# Patient Record
Sex: MALE | Race: WHITE | NOT HISPANIC OR LATINO | Employment: OTHER | URBAN - METROPOLITAN AREA
[De-identification: names, ages, dates, MRNs, and addresses within clinical notes are randomized per-mention and may not be internally consistent; named-entity substitution may affect disease eponyms.]

---

## 2018-03-06 ENCOUNTER — OFFICE VISIT - RIVER FALLS (OUTPATIENT)
Dept: FAMILY MEDICINE | Facility: CLINIC | Age: 68
End: 2018-03-06

## 2018-03-06 ASSESSMENT — MIFFLIN-ST. JEOR: SCORE: 1807.39

## 2018-03-07 LAB
CHOLEST SERPL-MCNC: 155 MG/DL
CHOLEST/HDLC SERPL: 4.1 {RATIO}
CREAT SERPL-MCNC: 0.96 MG/DL (ref 0.7–1.25)
GLUCOSE BLD-MCNC: 113 MG/DL (ref 65–99)
HDLC SERPL-MCNC: 38 MG/DL
LDLC SERPL CALC-MCNC: 94 MG/DL
NONHDLC SERPL-MCNC: 117 MG/DL
PSA SERPL-MCNC: 1.4 NG/ML
TRIGL SERPL-MCNC: 124 MG/DL

## 2018-04-17 ENCOUNTER — OFFICE VISIT - RIVER FALLS (OUTPATIENT)
Dept: FAMILY MEDICINE | Facility: CLINIC | Age: 68
End: 2018-04-17

## 2018-04-17 ASSESSMENT — MIFFLIN-ST. JEOR: SCORE: 1799.22

## 2018-04-18 LAB
GLUCOSE BLD-MCNC: 109 MG/DL (ref 65–99)
HBA1C MFR BLD: 7.2 %

## 2018-04-25 ENCOUNTER — OFFICE VISIT - RIVER FALLS (OUTPATIENT)
Dept: FAMILY MEDICINE | Facility: CLINIC | Age: 68
End: 2018-04-25

## 2018-04-25 ASSESSMENT — MIFFLIN-ST. JEOR: SCORE: 1817.36

## 2018-05-23 ENCOUNTER — OFFICE VISIT - RIVER FALLS (OUTPATIENT)
Dept: FAMILY MEDICINE | Facility: CLINIC | Age: 68
End: 2018-05-23

## 2019-01-21 ENCOUNTER — AMBULATORY - RIVER FALLS (OUTPATIENT)
Dept: FAMILY MEDICINE | Facility: CLINIC | Age: 69
End: 2019-01-21

## 2019-01-22 LAB
HBA1C MFR BLD: 6.2 %
MICROALBUMIN UR-MCNC: 0.2 MG/DL

## 2019-01-25 ENCOUNTER — OFFICE VISIT - RIVER FALLS (OUTPATIENT)
Dept: FAMILY MEDICINE | Facility: CLINIC | Age: 69
End: 2019-01-25

## 2019-01-25 ASSESSMENT — MIFFLIN-ST. JEOR: SCORE: 1726.65

## 2020-02-25 ENCOUNTER — AMBULATORY - RIVER FALLS (OUTPATIENT)
Dept: FAMILY MEDICINE | Facility: CLINIC | Age: 70
End: 2020-02-25

## 2020-02-26 ENCOUNTER — COMMUNICATION - RIVER FALLS (OUTPATIENT)
Dept: FAMILY MEDICINE | Facility: CLINIC | Age: 70
End: 2020-02-26

## 2020-02-26 LAB
BUN SERPL-MCNC: 17 MG/DL (ref 7–25)
BUN/CREAT RATIO - HISTORICAL: ABNORMAL (ref 6–22)
CALCIUM SERPL-MCNC: 8.9 MG/DL (ref 8.6–10.3)
CHLORIDE BLD-SCNC: 105 MMOL/L (ref 98–110)
CHOLEST SERPL-MCNC: 144 MG/DL
CHOLEST/HDLC SERPL: 4 {RATIO}
CO2 SERPL-SCNC: 26 MMOL/L (ref 20–32)
CREAT SERPL-MCNC: 0.88 MG/DL (ref 0.7–1.25)
CREAT UR-MCNC: 111 MG/DL (ref 20–320)
EGFRCR SERPLBLD CKD-EPI 2021: 89 ML/MIN/1.73M2
GLUCOSE BLD-MCNC: 105 MG/DL (ref 65–99)
HBA1C MFR BLD: 6.4 %
HDLC SERPL-MCNC: 36 MG/DL
LDLC SERPL CALC-MCNC: 90 MG/DL
MICROALBUMIN UR-MCNC: 0.3 MG/DL
MICROALBUMIN/CREAT UR: 3 MG/G{CREAT}
NONHDLC SERPL-MCNC: 108 MG/DL
POTASSIUM BLD-SCNC: 4.5 MMOL/L (ref 3.5–5.3)
PSA SERPL-MCNC: 1 NG/ML
SODIUM SERPL-SCNC: 139 MMOL/L (ref 135–146)
TRIGL SERPL-MCNC: 86 MG/DL

## 2020-03-06 ENCOUNTER — OFFICE VISIT - RIVER FALLS (OUTPATIENT)
Dept: FAMILY MEDICINE | Facility: CLINIC | Age: 70
End: 2020-03-06

## 2020-03-06 ASSESSMENT — MIFFLIN-ST. JEOR: SCORE: 1740.25

## 2021-04-06 ENCOUNTER — AMBULATORY - RIVER FALLS (OUTPATIENT)
Dept: FAMILY MEDICINE | Facility: CLINIC | Age: 71
End: 2021-04-06

## 2021-04-07 LAB
BUN SERPL-MCNC: 21 MG/DL (ref 7–25)
BUN/CREAT RATIO - HISTORICAL: ABNORMAL (ref 6–22)
CALCIUM SERPL-MCNC: 8.9 MG/DL (ref 8.6–10.3)
CHLORIDE BLD-SCNC: 107 MMOL/L (ref 98–110)
CHOLEST SERPL-MCNC: 165 MG/DL
CHOLEST/HDLC SERPL: 4 {RATIO}
CO2 SERPL-SCNC: 25 MMOL/L (ref 20–32)
CREAT SERPL-MCNC: 1.01 MG/DL (ref 0.7–1.25)
CREAT UR-MCNC: 100 MG/DL (ref 20–320)
EGFRCR SERPLBLD CKD-EPI 2021: 76 ML/MIN/1.73M2
GLUCOSE BLD-MCNC: 116 MG/DL (ref 65–99)
HBA1C MFR BLD: 6.3 %
HDLC SERPL-MCNC: 41 MG/DL
LDLC SERPL CALC-MCNC: 101 MG/DL
MICROALBUMIN UR-MCNC: 0.2 MG/DL
MICROALBUMIN/CREAT UR: 2 MG/G{CREAT}
NONHDLC SERPL-MCNC: 124 MG/DL
POTASSIUM BLD-SCNC: 4.7 MMOL/L (ref 3.5–5.3)
PSA SERPL-MCNC: 0.8 NG/ML
SODIUM SERPL-SCNC: 139 MMOL/L (ref 135–146)
TRIGL SERPL-MCNC: 130 MG/DL

## 2021-04-08 ENCOUNTER — COMMUNICATION - RIVER FALLS (OUTPATIENT)
Dept: FAMILY MEDICINE | Facility: CLINIC | Age: 71
End: 2021-04-08

## 2021-04-09 ENCOUNTER — OFFICE VISIT - RIVER FALLS (OUTPATIENT)
Dept: FAMILY MEDICINE | Facility: CLINIC | Age: 71
End: 2021-04-09

## 2021-04-09 ENCOUNTER — COMMUNICATION - RIVER FALLS (OUTPATIENT)
Dept: FAMILY MEDICINE | Facility: CLINIC | Age: 71
End: 2021-04-09

## 2021-04-09 ASSESSMENT — MIFFLIN-ST. JEOR: SCORE: 1765.65

## 2021-04-10 LAB
ERYTHROCYTE [DISTWIDTH] IN BLOOD BY AUTOMATED COUNT: 13.1 % (ref 11–15)
HCT VFR BLD AUTO: 43.7 % (ref 38.5–50)
HGB BLD-MCNC: 14.9 GM/DL (ref 13.2–17.1)
MCH RBC QN AUTO: 29.6 PG (ref 27–33)
MCHC RBC AUTO-ENTMCNC: 34.1 GM/DL (ref 32–36)
MCV RBC AUTO: 86.7 FL (ref 80–100)
PLATELET # BLD AUTO: 238 10*3/UL (ref 140–400)
PMV BLD: 9.5 FL (ref 7.5–12.5)
RBC # BLD AUTO: 5.04 10*6/UL (ref 4.2–5.8)
TSH SERPL DL<=0.005 MIU/L-ACNC: 4.23 MIU/L (ref 0.4–4.5)
WBC # BLD AUTO: 6.6 10*3/UL (ref 3.8–10.8)

## 2021-04-12 ENCOUNTER — COMMUNICATION - RIVER FALLS (OUTPATIENT)
Dept: FAMILY MEDICINE | Facility: CLINIC | Age: 71
End: 2021-04-12

## 2021-04-12 ENCOUNTER — AMBULATORY - RIVER FALLS (OUTPATIENT)
Dept: FAMILY MEDICINE | Facility: CLINIC | Age: 71
End: 2021-04-12

## 2021-06-01 ENCOUNTER — OFFICE VISIT - RIVER FALLS (OUTPATIENT)
Dept: FAMILY MEDICINE | Facility: CLINIC | Age: 71
End: 2021-06-01

## 2021-06-01 ASSESSMENT — MIFFLIN-ST. JEOR: SCORE: 1752.5

## 2021-06-02 ENCOUNTER — RECORDS - HEALTHEAST (OUTPATIENT)
Dept: CARDIOLOGY | Facility: TELEHEALTH | Age: 71
End: 2021-06-02

## 2021-06-02 DIAGNOSIS — I48.92 ATRIAL FLUTTER (H): ICD-10-CM

## 2021-06-25 ENCOUNTER — OFFICE VISIT - RIVER FALLS (OUTPATIENT)
Dept: FAMILY MEDICINE | Facility: CLINIC | Age: 71
End: 2021-06-25

## 2021-06-26 LAB
CHOLEST SERPL-MCNC: 102 MG/DL
CHOLEST/HDLC SERPL: 2.7 {RATIO}
HDLC SERPL-MCNC: 38 MG/DL
LDLC SERPL CALC-MCNC: 49 MG/DL
NONHDLC SERPL-MCNC: 64 MG/DL
TRIGL SERPL-MCNC: 70 MG/DL

## 2021-06-30 ENCOUNTER — COMMUNICATION - RIVER FALLS (OUTPATIENT)
Dept: FAMILY MEDICINE | Facility: CLINIC | Age: 71
End: 2021-06-30

## 2022-02-11 VITALS
OXYGEN SATURATION: 95 % | DIASTOLIC BLOOD PRESSURE: 68 MMHG | HEART RATE: 66 BPM | WEIGHT: 227.7 LBS | HEIGHT: 67 IN | SYSTOLIC BLOOD PRESSURE: 120 MMHG | BODY MASS INDEX: 35.74 KG/M2 | TEMPERATURE: 98.4 F

## 2022-02-11 VITALS
SYSTOLIC BLOOD PRESSURE: 137 MMHG | DIASTOLIC BLOOD PRESSURE: 75 MMHG | HEIGHT: 67 IN | BODY MASS INDEX: 34.84 KG/M2 | WEIGHT: 222 LBS | HEART RATE: 71 BPM

## 2022-02-11 VITALS
BODY MASS INDEX: 36.19 KG/M2 | HEART RATE: 105 BPM | WEIGHT: 230.6 LBS | SYSTOLIC BLOOD PRESSURE: 118 MMHG | TEMPERATURE: 97.8 F | DIASTOLIC BLOOD PRESSURE: 78 MMHG | OXYGEN SATURATION: 95 % | HEIGHT: 67 IN

## 2022-02-11 VITALS
SYSTOLIC BLOOD PRESSURE: 108 MMHG | DIASTOLIC BLOOD PRESSURE: 70 MMHG | BODY MASS INDEX: 35.31 KG/M2 | HEIGHT: 67 IN | HEART RATE: 98 BPM | SYSTOLIC BLOOD PRESSURE: 128 MMHG | WEIGHT: 225 LBS | TEMPERATURE: 98.1 F | DIASTOLIC BLOOD PRESSURE: 62 MMHG

## 2022-02-11 VITALS
WEIGHT: 238 LBS | DIASTOLIC BLOOD PRESSURE: 72 MMHG | HEIGHT: 67 IN | DIASTOLIC BLOOD PRESSURE: 70 MMHG | HEART RATE: 60 BPM | WEIGHT: 239.8 LBS | BODY MASS INDEX: 37.98 KG/M2 | SYSTOLIC BLOOD PRESSURE: 138 MMHG | HEIGHT: 67 IN | BODY MASS INDEX: 37.64 KG/M2 | WEIGHT: 242 LBS | HEIGHT: 67 IN | HEART RATE: 59 BPM | SYSTOLIC BLOOD PRESSURE: 130 MMHG | TEMPERATURE: 98.4 F | BODY MASS INDEX: 37.35 KG/M2

## 2022-02-16 NOTE — LETTER
(Inserted Image. Unable to display)   September 16, 2021  JAMES MCCRACKEN  [address] [place] [address]        Dear JAMES,    Thank you for selecting [facility] for your healthcare needs.    Our records indicate you are due for the following services:     Follow-up office visit      (FYI   Regarding office visits: In some instances, a video visit or telephone visit may be offered as an option.)    To schedule an appointment or if you have further questions, please contact your clinic at [phone].    Powered by [employer]    Sincerely,    Carlitos Espinosa MD, FACP

## 2022-02-16 NOTE — RESULTS
Patient:   JAMES MCCRACKEN            MRN: [id number]            FIN: [id number]               Age:   70 years     Sex:  Male     :  1950   Associated Diagnoses:   Atrial flutter   Author:   Carlitos Espinosa MD      Procedure   EKG procedure   Date/ Time:  2021 9:32:00 AM.     Supervised by: Carlitos Espinosa MD.     Indication: palpitations.     Position: supine.     EKG findings   Interpretation: Carlitos Espinosa MD.     Rhythm: heart rate  86  beats/min, atrial flutter variable conduction.     Axis: normal axis, normal configuration.     P waves: absent: normal.     QRS complex: normal.     ST-T-U complex: normal.        Impression and Plan   Diagnosis     Atrial flutter (PAF77-ON I48.92).

## 2022-02-16 NOTE — LETTER
(Inserted Image. Unable to display)     February 22, 2021      JAMES MCCRACKEN  [address], [place] [id number]          Dear JAMES,      Thank you for selecting [facility] (previously [facility]) for your healthcare needs.    Our records indicate you are due for the following services:    Diabetic Exam ~ Please bring your glucose meter and/or your blood glucose diary to your appointment.    Urine Labs ~ Please be prepared to leave a urine specimen for evaluation.  Fasting Lab Tests ~ Please do not eat or drink anything 10 hours prior to your scheduled appointment time.  (Water and any medications that you may need are allowed unless directed otherwise.)    If you had your labs done at another facility or with Direct Access Lab Testing at [facility], please bring in a copy of the results to your next visit, mail a copy, or drop off a copy of your results to your Healthcare Provider.    (FYI   Regarding office visits: In some instances, a video visit or telephone visit may be offered as an option.)    You are due for lab work and an office visit; please schedule the lab appointment 1 week before the office visit.  This will assure all results are available to discuss with your Healthcare Provider during your visit.    **It is very helpful if you bring your medication bottles to your appointment.  This assures we have all of your current medications, including strength and dosing information, documented accurately in your medical record.    To schedule an appointment or if you have further questions, please contact your clinic at [phone].      Powered by [email or web address]    Sincerely,    Carlitos Espinosa MD, FACP

## 2022-02-16 NOTE — LETTER
(Inserted Image. Unable to display)       [address] [place] [address]  April 12, 2021      JAMES MCCRACKEN      [address] [place] [address]        Dear JAMES,    Thank you for selecting [facility] for your healthcare needs.  Below you will find the results of your recent tests done at our clinic.     Normal results.      Result Name Current Result Reference Range   TSH (mIU/L)  4.23 4/9/2021 0.40 - 4.50   WBC  6.6 4/9/2021 3.8 - 10.8   Hgb (gm/dL)  14.9 4/9/2021 13.2 - 17.1   Platelet  238 4/9/2021 140 - 400       Please contact me or my assistant at [phone] if you have any questions.     Sincerely,        Carlitos Espinosa MD

## 2022-02-16 NOTE — LETTER
(Inserted Image. Unable to display)   February 26, 2020      JAMES MCCRACKEN      [address] [place], [place] [id number]        Dear JAMES,    Thank you for selecting [facility] ([facility] for your healthcare needs.  Below you will find the results of your recent tests done at our clinic.    Results are acceptable.      Result Name Current Result Previous Result Reference Range   Sodium Level (mmol/L)  139 2/25/2020  137 3/6/2018 135 - 146   Potassium Level (mmol/L)  4.5 2/25/2020  4.7 3/6/2018 3.5 - 5.3   Chloride Level (mmol/L)  105 2/25/2020  103 3/6/2018 98 - 110   CO2 Level (mmol/L)  26 2/25/2020  27 3/6/2018 20 - 32   Glucose Level (mg/dL) ((H)) 105 2/25/2020 ((H)) 109 4/17/2018 65 - 99   BUN (mg/dL)  17 2/25/2020  15 3/6/2018 7 - 25   Creatinine Level (mg/dL)  0.88 2/25/2020  0.96 3/6/2018 0.70 - 1.25   eGFR (mL/min/1.73m2)  89 2/25/2020  83 3/6/2018 > OR = 60 -    Calcium Level (mg/dL)  8.9 2/25/2020  9.2 3/6/2018 8.6 - 10.3   Hgb A1c ((H)) 6.4 2/25/2020 ((H)) 6.2 1/21/2019  - <5.7   Cholesterol (mg/dL)  144 2/25/2020  155 3/6/2018  - <200   Non-HDL Cholesterol  108 2/25/2020  117 3/6/2018  - <130   HDL (mg/dL) ((L)) 36 2/25/2020 ((L)) 38 3/6/2018 > OR = 40 -    Cholesterol/HDL Ratio  4.0 2/25/2020  4.1 3/6/2018  - <5.0   LDL  90 2/25/2020  94 3/6/2018    Triglyceride (mg/dL)  86 2/25/2020  124 3/6/2018  - <150   PSA (ng/mL)  1.0 2/25/2020  1.4 3/6/2018  - < OR = 4.0   Ur Microalbumin/Creatinine Ratio  3 2/25/2020   - <30       Please contact me or my assistant at [phone] if you have any questions.     Sincerely,        Carlitos Espinosa MD   no

## 2022-02-16 NOTE — LETTER
(Inserted Image. Unable to display)     December 13, 2019      JAMES MCCRACKEN  [address], [place] [id number]          Dear JAMES,      Thank you for selecting [facility] (previously [facility]) for your healthcare needs.    Our records indicate you are due for the following services:    Diabetic Exam ~ Please bring your glucose meter and/or your blood glucose diary to your appointment.    Urine Labs ~ Please be prepared to leave a urine specimen for evaluation.  Fasting Lab Tests ~ Please do not eat or drink anything 10 hours prior to your scheduled appointment time.  (Water and any medications that you may need are allowed unless directed otherwise.)    If you had your labs done at another facility or with Direct Access Lab Testing at [facility], please bring in a copy of the results to your next visit, mail a copy, or drop off a copy of your results to your Healthcare Provider.    You are due for lab work and an office visit; please schedule the lab appointment 1 week before the office visit.  This will assure all results are available to discuss with your provider during your visit.    **It is very helpful if you bring your medication bottles to your appointment.  This assures we have all of your current medications, including strength and dosing information, documented accurately in your medical record.    To schedule an appointment or if you have further questions, please contact your primary clinic:   Critical access hospital       [phone]   [facility]       [phone]              [facility]     [phone]      Powered by [employer] and [employer]    Sincerely,    Carlitos Espinosa MD, FACP

## 2022-02-16 NOTE — RESULTS
(Inserted Image. Unable to display)              HOLTER MONITOR REPORT    JAMES MCCRACKEN : 1950  Date of Exam: 2021 at 9:34 a.m.  MRN: [id number]  Ordering HCP:  Carlitos Espinosa MD, FACP       INDICATION:  Atrial flutter.      FINDINGS: The Holter monitor recording period was 23 hours and 51 minutes with analysis time of 23 hours and 20 minutes.       The rhythm is atrial flutter.  Average rate of 86.  Minimum rate of 44.  Maximum rate of 161.  No pauses.                     Ventricular ectopics number 6 including 1 pair but no triplets or runs.          IMPRESSION:  Atrial flutter with controlled ventricular response.           Carlitos Espinosa MD, FACP  Interpreting HCP                    JSYLVIE/kinsey   D:  2021                                                                          T:  2021    HOLTER MONITOR REPORT

## 2022-02-16 NOTE — PROGRESS NOTES
Chief Complaint    DM check, f/u labs.  History of Present Illness      Iker is here for follow-up of his type 2 diabetes.  He continues to smoke about 5 cigarettes/day.  Reviewed his laboratories his estimated 10-year cardiovascular risk is over 30%.  We discussed the importance of statin therapy and especially of quitting smoking.  Does not wish pharmacotherapy assistance with quitting smoking.  Declines statin he really does not want to take any medications.  He is monitoring his blood glucose averages less than 110.  He feels well he is 20 pounds of his weight loss off.  Will be seeing the eye doctor tomorrow.  He is not exercising regularly and we discussed the importance of this as well.  Recommended 30 minutes minimum 5 days a week.  Review of Systems      No polyuria, headache, chest pain, dyspnea, edema.  Physical Exam   Vitals & Measurements    T: 98.1   F (Tympanic)  HR: 98(Peripheral)  BP: 128/70     HT: 66.75 in  WT: 225 lb  BMI: 35.5       No distress.  Alert and oriented.  Eyes normal.  HEENT is unremarkable.  Neck reveals normal carotid pulsations.  Chest clear.  Cardiac exam is regular no murmur no edema.  Feet appear normal.  Pulses and sensation to both monofilament and vibration intact.  Assessment/Plan       Current tobacco use (Z72.0)         Encouraged quitting as above.  Does not wish assistance.         Ordered:          [id number] office outpatient visit 25 minutes (Charge), Quantity: 1, Type 2 diabetes mellitus with hemoglobin A1c goal of less than 7.0%  Obesity  Current tobacco use  Family history of colon cancer in mother                Family history of colon cancer in mother (Z80.0)         Colonoscopy 2 years ago.         Ordered:          70974 office outpatient visit 25 minutes (Charge), Quantity: 1, Type 2 diabetes mellitus with hemoglobin A1c goal of less than 7.0%  Obesity  Current tobacco use  Family history of colon cancer in mother                Obesity (Probable) (E66.9)          Encouraged continued weight loss.         Ordered:          66756 office outpatient visit 25 minutes (Charge), Quantity: 1, Type 2 diabetes mellitus with hemoglobin A1c goal of less than 7.0%  Obesity  Current tobacco use  Family history of colon cancer in mother                Type 2 diabetes mellitus with hemoglobin A1c goal of less than 7.0% (E11.9)         Controlled without pharmacotherapy discussed the importance of quitting smoking, adding a statin to the option of metformin.  Flu and Pneumovax today.         Ordered:          86677 office outpatient visit 25 minutes (Charge), Quantity: 1, Type 2 diabetes mellitus with hemoglobin A1c goal of less than 7.0%  Obesity  Current tobacco use  Family history of colon cancer in mother                Orders:         Miscellaneous Rx Supply, AccuCheck Lancets, See Instructions, Instructions: Tests 7x week, Supply, # 1 box(es), 11 Refill(s), Type: Maintenance, Pharmacy: [facility] #[id number], Tests 7x week, (Ordered)         Miscellaneous Rx Supply, AccuCheck Test Strips, See Instructions, Instructions: Test 7x week, Supply, # 1 box(es), 11 Refill(s), Type: Maintenance, Pharmacy: [facility] #[id number], Test 7x week, (Ordered)  Patient Information     Name:JAMES MCCRACKEN      Address:      [address] [place] [id number]     Sex:Male     YOB: 1950     [phone]     Emergency Contact:CHRISITNA MCCRACKEN     MRN:[id number]     FIN:[id number]     Location:[facility]     Date of Service:03/06/2020      Primary Care Physician:       Carlitos Espinosa MD, [phone]      Attending Physician:       Carlitos Espinosa MD, [phone]  Problem List/Past Medical History    Ongoing     Current tobacco use     Family history of colon cancer in mother     Obesity     Type 2 diabetes mellitus with hemoglobin A1c goal of less than 7.0%    Historical     CHEST PAIN  Procedure/Surgical History     Colonoscopy  (05/23/2018)      Comments: Indication: Family History of colon cancer      Sedation:Fentanyl 150 mcg, Versed 2 mg      Findings: diverticulosis and one hyperplastic polyp      Rec: Repeat in 5 years.     Extracapsular cataract extraction and insertion of intraocular lens (03/27/2018)      Comments: Right..     Extracapsular cataract extraction and insertion of intraocular lens (03/13/2018)      Comments: Left..     Cholecystectomy  Medications    AccuCheck Lancets, See Instructions, 11 refills    AccuCheck Test Strips, See Instructions, 11 refills  Allergies    No Known Medication Allergies  Social History    Smoking Status - 03/06/2020     Current every day smoker     Alcohol      Current, 03/06/2018     Employment/School      Retired, 03/06/2018     Home/Environment      Marital status:  (Living together)., 03/06/2018     Tobacco      5-9 cigarettes (between 1/4 to 1/2 pack)/day in last 30 days, 03/06/2018  Family History    CAD - Coronary artery disease: Mother (Dx at 88).    Cancer of colon: Mother (Dx at 58).    Parkinson disease: Mother.  Lab Results          Lab Results (Last 4 results within 90 days)           Sodium Level: 139 mmol/L [135 mmol/L - 146 mmol/L] (02/25/20 08:49:00)          Potassium Level: 4.5 mmol/L [3.5 mmol/L - 5.3 mmol/L] (02/25/20 08:49:00)          Chloride Level: 105 mmol/L [98 mmol/L - 110 mmol/L] (02/25/20 08:49:00)          CO2 Level: 26 mmol/L [20 mmol/L - 32 mmol/L] (02/25/20 08:49:00)          Glucose Level: 105 mg/dL High [65 mg/dL - 99 mg/dL] (02/25/20 08:49:00)          BUN: 17 mg/dL [7 mg/dL - 25 mg/dL] (02/25/20 08:49:00)          Creatinine Level: 0.88 mg/dL [0.7 mg/dL - 1.25 mg/dL] (02/25/20 08:49:00)          BUN/Creat Ratio: NOT APPLICABLE [6  - 22] (02/25/20 08:49:00)          eGFR: 89 mL/min/1.73m2 (02/25/20 08:49:00)          eGFR African American: 103 mL/min/1.73m2 (02/25/20 08:49:00)          Calcium Level: 8.9 mg/dL [8.6 mg/dL - 10.3 mg/dL] (02/25/20  08:49:00)          Hgb A1c: 6.4 High (02/25/20 08:49:00)          Cholesterol: 144 mg/dL (02/25/20 08:49:00)          Non-HDL Cholesterol: 108 (02/25/20 08:49:00)          HDL: 36 mg/dL Low (02/25/20 08:49:00)          Cholesterol/HDL Ratio: 4 (02/25/20 08:49:00)          LDL: 90 (02/25/20 08:49:00)          Triglyceride: 86 mg/dL (02/25/20 08:49:00)          PSA: 1 ng/mL (02/25/20 08:49:00)          U Microalbumin: 0.3 mg/dL (02/25/20 08:49:00)          Ur Creatinine: 111 mg/dL [20 mg/dL - 320 mg/dL] (02/25/20 08:49:00)          Ur Microalbumin/Creatinine Ratio: 3 (02/25/20 08:49:00)

## 2022-02-16 NOTE — TELEPHONE ENCOUNTER
---------------------  From: Rika Kelley LPN (Phone Messages Pool ([id number]_[facility]))   To: [facility] Message Pool ([id number]);     Sent: 7/14/2021 9:09:41 AM CDT  Subject: refills     Phone Message    PCP:   NAYA      Time of Call:  9:00am       Person Calling:  pt  Phone number:  [phone]    Note:   Pt calling stating he needs refills of Xarelto and simvastatin.    No RTC on when pt is to return. Pt not sure if he was suppose to continue Xarelto. Sent in 90 day supply of simvastatin since labs were done 6/25. Please advise on Xarelto/additional refills.    Last office visit and reason:  6/1/21 Follow up atrial flutter---------------------  From: An/Lisa GASCA ([facility] Message Pool ([id number]))   To: Carlitos Espinosa MD;     Sent: 7/15/2021 7:11:15 AM CDT  Subject: FW: refills---------------------  From: Carlitos Espinosa MD   To: [facility] Message Pool ([id number]);     Sent: 7/15/2021 7:13:55 AM CDT  Subject: RE: refills     OK to refill both for one year. I would like to see the patient in 2 months.LVMTCB. I filled meds x 1 year. Please notify that he needs to schedule a visit with NAYA in 2 months.pt called back... message given and pt will call back to schedule the appt

## 2022-02-16 NOTE — TELEPHONE ENCOUNTER
---------------------  From: Luiz Villa LPN   Sent: 4/12/2021 9:43:48 AM CDT  Subject: General Message     Placed [employer] holter @ 013 per Dr Carlitos Acuña for dx of palpitations. Gave patient written & verbal instructions and sent patient home with all [employer] supplies. Patient indicated he understood. End of service date is 4-14-21.Printed Holter Monitor Report and routed to [facility] for interpretation.

## 2022-02-16 NOTE — PROGRESS NOTES
"Chief Complaint    here today for \"regular check up\"  History of Present Illness       Iker is a 70-year-old with a history of untreated dyslipidemia and diabetes controlled with dietary therapy who complains of anxiety.  He feels anxious in a quiet moments when he is sitting at rest nearly every day and has a little trouble relaxing.  When I spoke with him further it became clear that he feels palpitations that last up to several minutes at these times and irregularity though not racing necessarily of the heart.  No exertional chest pain or dyspnea.  No PND, orthopnea, edema.  No polyuria or polydipsia.  He has had both coronavirus immunizations.  BRYSON-7 is 4.  Review of Systems       No weight loss, fever, chills, headache, myalgia, cough, dyspnea.  Physical Exam   Vitals & Measurements    T: 97.8  F (Tympanic)  HR: 105 (Peripheral)  BP: 118/78  SpO2: 95%     HT: 66.75 in  WT: 230.6 lb  BMI: 36.38        Patient is an overweight but otherwise healthy-appearing man in no distress.  Alert and oriented.  Eyes normal.  HEENT exam is masked.  Neck is supple no nodes or thyromegaly.  Carotid pulsations normal.  Cardiac exam irregular.  No murmur.  No edema.  Abdomen nontender.  Feet appear normal.  Pulses and sensation intact in the feet.  Assessment/Plan       Atrial flutter (I48.92)          New onset atrial flutter with controlled response.  Does not appear to need a medication for rate control.  Discussed anticoagulation the options of direct acting oral coagulant anticoagulant versus warfarin and he chooses the former.  TSH, CBC, echocardiogram, 48-hour Holter.         Ordered:          apixaban, ( 5 mg ), Oral, bid, # 60 tab(s), 2 Refill(s), Type: Maintenance, [id number], 5 mg Oral bid, 66.75, in, 04/09/21 8:58:00 CDT, Height Measured, 230.6, lb, 04/09/21 8:58:00 CDT, Weight Measured, (Ordered)          41568 office o/p est mod 30-39 min (Charge), Quantity: 1, Atrial flutter  Type 2 " diabetes mellitus with hemoglobin A1c goal of less than 7.0%  Dyslipidemia  Current tobacco use          CBC (h/h, RBC, indices, WBC, Plt)* ([facility]), Specimen Type: Blood, Collection Date: 04/09/21 9:28:00 CDT          Echocardiogram (Request), Atrial flutter                Current tobacco use (Z72.0)          Patient is encouraged to abstain from tobacco use.         Ordered:          55284 office o/p est mod 30-39 min (Charge), Quantity: 1, Palpitations  Dyslipidemia  Type 2 diabetes mellitus with hemoglobin A1c goal of less than 7.0%  Current tobacco use          52948 office o/p est mod 30-39 min (Charge), Quantity: 1, Atrial flutter  Type 2 diabetes mellitus with hemoglobin A1c goal of less than 7.0%  Dyslipidemia  Current tobacco use                Dyslipidemia (E78.5)          Estimated 10-year cardiovascular risk is 30.8% and weight improved to 11.2% with optimal therapy including statin, aspirin, and discontinuing smoking.  Encouraged him to give up smoking.  We will start a statin.         Ordered:          64626 office o/p est mod 30-39 min (Charge), Quantity: 1, Palpitations  Dyslipidemia  Type 2 diabetes mellitus with hemoglobin A1c goal of less than 7.0%  Current tobacco use          13084 office o/p est mod 30-39 min (Charge), Quantity: 1, Atrial flutter  Type 2 diabetes mellitus with hemoglobin A1c goal of less than 7.0%  Dyslipidemia  Current tobacco use                Type 2 diabetes mellitus with hemoglobin A1c goal of less than 7.0% (E11.9)          Doing well on dietary therapy.          Ordered:          05550 office o/p est mod 30-39 min (Charge), Quantity: 1, Palpitations  Dyslipidemia  Type 2 diabetes mellitus with hemoglobin A1c goal of less than 7.0%  Current tobacco use          51626 office o/p est mod 30-39 min (Charge), Quantity: 1, Atrial flutter  Type 2 diabetes mellitus with hemoglobin A1c goal of less than 7.0%  Dyslipidemia  Current tobacco use                 Orders:         simvastatin, = 1 tab(s) ( 40 mg ), Oral, hs, # 90 tab(s), 0 Refill(s), Type: Maintenance, Pharmacy: [facility] #[id number], 1 tab(s) Oral hs, 66.75, in, 04/09/21 8:58:00 CDT, Height Measured, 230.6, lb, 04/09/21 8:58:00 CDT, Weight Measured, (Ordered)         48 Hour Holter Monitor (Request), Palpitations         46708 ecg routine ecg w/least 12 lds w/i+r (Charge), Quantity: 1, Palpitations         Basic Metabolic Panel* ([facility]), Specimen Type: Serum, Collection Date: 04/06/21 7:00:00 CDT         Hemoglobin A1c* ([facility]), Specimen Type: Blood, Collection Date: 04/06/21 7:00:00 CDT         Lipid panel with reflex to direct ldl* ([facility]), Specimen Type: Serum, Collection Date: 04/06/21 7:00:00 CDT         Microalbumin, random urine (w/creatinine)* ([facility]), Specimen Type: Urine, Collection Date: 04/06/21 7:00:00 CDT         PSA, Total (Room)* ([facility]), Specimen Type: Serum, Collection Date: 04/06/21 7:00:00 CDT         Return to Clinic (Request), RFV: lipids, Return in 6-8 weeks         TSH* ([facility]), Specimen Type: Serum, Collection Date: 04/09/21 9:10:00 CDT  Patient Information     Name:JAMES MCCRACKEN      Address:      [address] [place] [id number]     Sex:Male     YOB: 1950     [phone]     Emergency Contact:CHRISTINA MCCRACKEN     MRN:[id number]     FIN:[id number]     Location:[facility]     Date of Service:04/09/2021      Primary Care Physician:       Carlitos Espinosa MD, [phone]      Attending Physician:       Carlitos Espinosa MD, [phone]  Problem List/Past Medical History    Ongoing     Current tobacco use     Dyslipidemia     Family history of colon cancer in mother     Obesity     Refusal of statin medication by patient     Type 2 diabetes mellitus with hemoglobin A1c goal of less than 7.0%    Historical     CHEST PAIN  Procedure/Surgical History     Colonoscopy (05/23/2018)      Comments: Indication: Family History of colon cancer       Sedation:Fentanyl 150 mcg, Versed 2 mg      Findings: diverticulosis and one hyperplastic polyp      Rec: Repeat in 5 years.     Extracapsular cataract extraction and insertion of intraocular lens (03/27/2018)      Comments: Right..     Extracapsular cataract extraction and insertion of intraocular lens (03/13/2018)      Comments: Left..     Cholecystectomy  Medications    AccuCheck Lancets, See Instructions, 11 refills    AccuCheck Test Strips, See Instructions, 11 refills  Allergies    No Known Medication Allergies  Social History    Smoking Status     Current every day smoker     Alcohol      Current     Electronic Cigarette/Vaping      Electronic Cigarette Use: Never.     Employment/School      Retired     Home/Environment      Marital status:  (Living together).     Tobacco      4 or less cigarettes(less than 1/4 pack)/day in last 30 days  Family History    CAD - Coronary artery disease: Mother (Dx at 88).    Cancer of colon: Mother (Dx at 58).    Parkinson disease: Mother.  Immunizations      Vaccine Date Status          influenza virus vaccine, inactivated 03/06/2020 Given          pneumococcal (PPSV23) 03/06/2020 Given  Lab Results          Lab Results (Last 4 results within 90 days)           Sodium Level: 139 mmol/L [135 mmol/L - 146 mmol/L] (04/06/21 10:00:00)          Potassium Level: 4.7 mmol/L [3.5 mmol/L - 5.3 mmol/L] (04/06/21 10:00:00)          Chloride Level: 107 mmol/L [98 mmol/L - 110 mmol/L] (04/06/21 10:00:00)          CO2 Level: 25 mmol/L [20 mmol/L - 32 mmol/L] (04/06/21 10:00:00)          Glucose Level: 116 mg/dL High [65 mg/dL - 99 mg/dL] (04/06/21 10:00:00)          BUN: 21 mg/dL [7 mg/dL - 25 mg/dL] (04/06/21 10:00:00)          Creatinine Level: 1.01 mg/dL [0.7 mg/dL - 1.25 mg/dL] (04/06/21 10:00:00)          BUN/Creat Ratio: NOT APPLICABLE [6  - 22] (04/06/21 10:00:00)          eGFR: 76 mL/min/1.73m2 (04/06/21 10:00:00)          eGFR African American: 88 mL/min/1.73m2 (04/06/21  10:00:00)          Calcium Level: 8.9 mg/dL [8.6 mg/dL - 10.3 mg/dL] (04/06/21 10:00:00)          Hgb A1c: 6.3 High (04/06/21 10:00:00)          Cholesterol: 165 mg/dL (04/06/21 10:00:00)          Non-HDL Cholesterol: 124 (04/06/21 10:00:00)          HDL: 41 mg/dL (04/06/21 10:00:00)          Cholesterol/HDL Ratio: 4 (04/06/21 10:00:00)          LDL: 101 High (04/06/21 10:00:00)          Triglyceride: 130 mg/dL (04/06/21 10:00:00)          PSA: 0.8 ng/mL (04/06/21 10:00:00)          U Creatinine: 100 mg/dL [20 mg/dL - 320 mg/dL] (04/06/21 10:00:00)          U Microalbumin: 0.2 mg/dL (04/06/21 10:00:00)          Ur Microalbumin/Creatinine Ratio: 2 (04/06/21 10:00:00)  Diagnostic Results    EKG reveals atrial flutter with variable conduction otherwise is unremarkable.

## 2022-02-16 NOTE — LETTER
(Inserted Image. Unable to display)   June 01, 2021  JAMES MCCRACKEN  [address] [place] [address]          Dear JAMES,      Thank you for selecting [facility] for your healthcare needs.    Our records indicate you are due for the following services:     Fasting Lab Tests ~ Please do not eat or drink anything 10 hours prior to your scheduled appointment time.  (Water and any medications that you may need are allowed unless directed otherwise.)    If you had your labs done at another facility or with [facility], please bring in a copy of the results to your next visit, mail a copy, or drop off a copy of your results to your Healthcare Provider.    (FYI   Regarding office visits: In some instances, a video visit or telephone visit may be offered as an option.)    To schedule an appointment or if you have further questions, please contact your clinic at [phone].      Powered by [employer]    Sincerely,    Carlitos Espinosa M.D., FACP

## 2022-02-16 NOTE — CARE COORDINATION
Pt appears on [facility] chronic disease panel as out of parameters for statin.   Pt is trying to manage diet to stay off all medications.  Pt was seen 1/25/2019, pt has lost 20 pounds.  Placed new RTC 7/19 for DM and fasting labs

## 2022-02-16 NOTE — TELEPHONE ENCOUNTER
---------------------  From: Yasmine Odell RN (Unit 2 Pool ([id number]_[place] [facility]) )   To: Appointment Pool ([id number]);     Sent: 4/9/2021 12:38:50 PM CDT  Subject: 48 hour Holter     Please call to schedule Holter per [facility].  Reply to this message when scheduled. No return appointment needed.    Thank you,    Yasmine FRANCOCALLED CASSI AGOSTO VOICEMAIL TO CALL BACK AND SCHEDULEscheduled

## 2022-02-16 NOTE — TELEPHONE ENCOUNTER
---------------------  From: lEizabeth Raya CMA   To: NAYA Message Pool ([id number] - [place]);     Sent: 1/17/2019 9:43:11 AM CST  Subject: Pharamacy/Appt RADHA     PCP:   NAYA      Time of Call:  0909       Person Calling:  Patient  Phone number:  [phone]    Returned call at: [phone]    Note:   Patient called requesting new Rx for his diabetic supplies be sent to [facility]  as [facility] is closing. Confirmed he is using AccuCheck lancets and strips as his insurance would not cover Freestyle lite as on med list. Informed him a small supply would be sent, but he is overdue for labs and DM check. He was transferred to scheduling. Rx's sent.    Last office visit and reason:  4/25/18 Diabetes w/ Samia Padilla.

## 2022-02-16 NOTE — PROGRESS NOTES
Chief Complaint    follow up labs  History of Present Illness      Patient is here for follow-up of diabetes.  Since I last saw him his lost 20 pounds.  He feels well.  Review of Systems      No polyuria, headache, chest pain, dyspnea, edema.  Physical Exam   Vitals & Measurements    HR: 71(Peripheral)  BP: 137/75     HT: 66.75 in  WT: 222 lb  BMI: 35.03       Patient is a healthy-appearing man in no distress.  Alert and oriented.  Chest clear.  Cardiac exam regular.  Extremities have no edema.  Pulses and sensation intact in feet.  Assessment/Plan       Family history of colon cancer in mother (Z80.0)         Continue screening colonoscopy.       Obesity (E66.9)         Encouraged further weight loss and regular physical activity.       Type 2 diabetes mellitus with hemoglobin A1c goal of less than 7.0% (E11.9)         Well-controlled.       Orders:         Return to Clinic (Request), RFV: Hgb A1c q6. BMP, PSA, lipids, MARTHA q12.         Return to Clinic (Request), RFV: DM, Return in 6 months, Instructions: Lab before visit  Patient Information     Name:JAMES MCCRACKEN      Address:      [address] [place] [id number]     Sex:Male     YOB: 1950     [phone]     Emergency Contact:CHRISTINA MCCRACKEN     MRN:[id number]     FIN:[id number]     Location:[facility]     Date of Service:01/25/2019      Primary Care Physician:       Carlitos Espinosa MD, [phone]      Attending Physician:       Carlitos Espinosa MD, [phone]  Problem List/Past Medical History    Ongoing     Family history of colon cancer in mother     Obesity     Type 2 diabetes mellitus with hemoglobin A1c goal of less than 7.0%    Historical     CHEST PAIN  Procedure/Surgical History     Colonoscopy (05/23/2018)      Comments: Indication: Family History of colon cancer      Sedation:Fentanyl 150 mcg, Versed 2 mg      Findings: diverticulosis and one hyperplastic polyp      Rec: Repeat in 5  years.     Extracapsular cataract extraction and insertion of intraocular lens (03/27/2018)      Comments: Right..     Extracapsular cataract extraction and insertion of intraocular lens (03/13/2018)      Comments: Left..     Cholecystectomy  Medications        AccuCheck Test Strips: See Instructions, Test 7x week, 1 box(es), 0 Refill(s).        AccuCheck Lancets: See Instructions, Tests 7x week, 1 box(es), 0 Refill(s).         Allergies    No Known Medication Allergies  Social History    Smoking Status - 01/25/2019     Current every day smoker     Alcohol      Current, 03/06/2018     Employment and Education      Retired, 03/06/2018     Home and Environment      Marital status:  (Living together)., 03/06/2018     Tobacco      5-9 cigarettes (between 1/4 to 1/2 pack)/day in last 30 days, 03/06/2018  Family History    CAD - Coronary artery disease: Mother (Dx at 88).    Cancer of colon: Mother (Dx at 58).    Parkinson disease: Mother.  Lab Results          Lab Results (Last 4 results within 90 days)           Hgb A1c: 6.2 High (01/21/19 13:05:00)          U Microalbumin: 0.2 mg/dL (01/21/19 13:05:00)          Microalbumin Comment: See comment (01/21/19 13:05:00)

## 2022-02-16 NOTE — RESULTS
Patient:   JAMES MCCRACKEN            MRN: [id number]            FIN: [id number]               Age:   70 years     Sex:  Male     :  1950   Associated Diagnoses:   Atrial flutter   Author:   Carlitos Espinosa MD      Procedure   EKG procedure   Date/ Time:  2021 11:31:00 AM.     Supervised by: Carlitos Espinosa MD.     Indication: atrial flutter.     Position: supine.     EKG findings   Interpretation: Carlitos Espinosa MD.     Rhythm: heart rate  53  beats/min, sinus normal.     Axis: normal axis, normal configuration.     Intervals: normal.     P waves: inverted in V1 and V2.     QRS complex: normal.     ST-T-U complex: normal.        Impression and Plan   Diagnosis     Atrial flutter ([id number]-UZ I48.92).     P wave abnormality.

## 2022-02-16 NOTE — TELEPHONE ENCOUNTER
Entered by Crystal Sam CMA on July 23, 2019 1:27:13 PM CDT  ---------------------  From: Crystal Sam CMA   To: [facility] [id number]    Sent: 7/23/2019 1:27:13 PM CDT  Subject: Medication Management     ** Submitted: **  Order:Miscellaneous Prescription (ACCU-CHEK GUIDE TEST STRIPS 100S)  See Instructions  TEST 7 TIMES PER WEEK AS DIRECTED  Qty:  100 unknown unit        Days Supply:  90        Refills:  0          Substitutions Allowed     Route To Pharmacy - [facility] [id number]    Signed by Crystal Sam CMA  7/23/2019 1:27:00 PM    ** Not Approved:  **  Order:Miscellaneous Prescription (ACCU-CHEK GUIDE TEST STRIPS 100S)  TEST 7 TIMES PER WEEK AS DIRECTED  Qty:  100 unknown unit        Days Supply:  90        Refills:  0          Substitutions Allowed     Route To Pharmacy - [facility] [id number]   Signed by Crystal Sam CMA            ** Submitted: **  Order:Miscellaneous Prescription (ACCU-CHEK FASTCLIX LANCETS 102'S)  See Instructions  TEST 7 TIMES PER WEEK AS DIRECTED  Qty:  102 unknown unit        Days Supply:  90        Refills:  0          Substitutions Allowed     Route To Pharmacy - [facility] [id number]    Signed by Crystal Sam CMA  7/23/2019 1:26:00 PM    ** Not Approved:  **  Order:Miscellaneous Prescription (ACCU-CHEK FASTCLIX LANCETS 102'S)  TEST 7 TIMES PER WEEK AS DIRECTED  Qty:  102 unknown unit        Days Supply:  90        Refills:  0          Substitutions Allowed     Route To Pharmacy - [facility] [id number]   Signed by Crystal Sam CMA            ------------------------------------------  From: [facility] [id number]  To: Carlitos Espinosa MD  Sent: July 23, 2019 10:07:10 AM CDT  Subject: Medication Management  Due: July 24, 2019 10:07:10 AM CDT    ** On Hold Pending Signature **  Drug: AccuCheck Test Strips  TEST 7X WEEK  Quantity: 1 box(es)     Days Supply: 0         Refills: 0  Substitutions Allowed  Notes from Pharmacy:     Dispensed Drug: ACCU-CHEK GUIDE  TEST STRIPS 100S  TEST 7 TIMES PER WEEK AS DIRECTED  Quantity: 100 unknown unit Days Supply: 90        Refills: 0  Substitutions Allowed  Notes from Pharmacy:     ** On Hold Pending Signature **  Drug: AccuCheck Lancets  TESTS 7X WEEK  Quantity: 1 box(es)     Days Supply: 0         Refills: 0  Substitutions Allowed  Notes from Pharmacy:     Dispensed Drug: ACCU-CHEK FASTCLIX LANCETS 102'S  TEST 7 TIMES PER WEEK AS DIRECTED  Quantity: 102 unknown unit Days Supply: 90        Refills: 0  Substitutions Allowed  Notes from Pharmacy:   ------------------------------------------supplies sent.

## 2022-02-16 NOTE — NURSING NOTE
Comprehensive Intake Entered On:  1/25/2019 9:46 AM CST    Performed On:  1/25/2019 9:44 AM CST by Hailey Boone MA               Summary   Chief Complaint :   follow up labs   Weight Measured :   222 lb(Converted to: 222 lb 0 oz, 100.70 kg)    Height Measured :   66.75 in(Converted to: 5 ft 7 in, 169.54 cm)    Body Mass Index :   35.03 kg/m2 (HI)    Body Surface Area :   2.18 m2   Systolic Blood Pressure :   137 mmHg (HI)    Diastolic Blood Pressure :   75 mmHg   Mean Arterial Pressure :   96 mmHg   Peripheral Pulse Rate :   71 bpm   BP Site :   Right arm   Hailey Boone MA - 1/25/2019 9:44 AM CST   Health Status   Allergies Verified? :   Yes   Medication History Verified? :   Yes   Tobacco Use? :   Current every day smoker   Hailey Boone MA - 1/25/2019 9:44 AM CST   Meds / Allergies   (As Of: 1/25/2019 9:46:08 AM CST)   Allergies (Active)   No Known Medication Allergies  Estimated Onset Date:   Unspecified ; Created By:   Hailey Boone MA; Reaction Status:   Active ; Category:   Drug ; Substance:   No Known Medication Allergies ; Type:   Allergy ; Updated By:   Hailey Boone MA; Reviewed Date:   4/26/2018 9:44 PM CDT        Medication List   (As Of: 1/25/2019 9:46:08 AM CST)   Prescription/Discharge Order    Miscellaneous Rx Supply  :   Miscellaneous Rx Supply ; Status:   Prescribed ; Ordered As Mnemonic:   AccuCheck Lancets ; Simple Display Line:   See Instructions, Tests 7x week, 1 box(es), 0 Refill(s) ; Ordering Provider:   Carlitos Espinosa MD; Catalog Code:   Miscellaneous Rx Supply ; Order Dt/Tm:   1/17/2019 9:43:44 AM          Miscellaneous Rx Supply  :   Miscellaneous Rx Supply ; Status:   Prescribed ; Ordered As Mnemonic:   AccuCheck Test Strips ; Simple Display Line:   See Instructions, Test 7x week, 1 box(es), 0 Refill(s) ; Ordering Provider:   Carlitos Espinosa MD; Catalog Code:   Miscellaneous Rx Supply ; Order Dt/Tm:   1/17/2019 9:43:44 AM

## 2022-02-16 NOTE — NURSING NOTE
Comprehensive Intake Entered On:  3/6/2020 9:17 AM CST    Performed On:  3/6/2020 9:13 AM CST by Lisa Guzman               Summary   Chief Complaint :   DM check, f/u labs.    Weight Measured :   225 lb(Converted to: 225 lb 0 oz, 102.06 kg)    Height Measured :   66.75 in(Converted to: 5 ft 7 in, 169.54 cm)    Body Mass Index :   35.5 kg/m2 (HI)    Body Surface Area :   2.19 m2   Systolic Blood Pressure :   128 mmHg   Diastolic Blood Pressure :   70 mmHg   Mean Arterial Pressure :   89 mmHg   Peripheral Pulse Rate :   98 bpm   BP Site :   Right arm   BP Method :   Manual   HR Method :   Manual   Temperature Tympanic :   98.1 DegF(Converted to: 36.7 DegC)    Lisa Guzman - 3/6/2020 9:13 AM CST   Health Status   Allergies Verified? :   Yes   Medication History Verified? :   Yes   Medical History Verified? :   Yes   Pre-Visit Planning Status :   Completed   Tobacco Use? :   Current every day smoker   Lisa Guzman - 3/6/2020 9:13 AM CST   Consents   Consent for Immunization Exchange :   Consent Granted   Consent for Immunizations to Providers :   Consent Granted   Lisa Guzman - 3/6/2020 9:13 AM CST   Meds / Allergies   (As Of: 3/6/2020 9:17:53 AM CST)   Allergies (Active)   No Known Medication Allergies  Estimated Onset Date:   Unspecified ; Created By:   Hailey Boone MA; Reaction Status:   Active ; Category:   Drug ; Substance:   No Known Medication Allergies ; Type:   Allergy ; Updated By:   Hailey Boone MA; Reviewed Date:   3/6/2020 9:15 AM CST        Medication List   (As Of: 3/6/2020 9:17:53 AM CST)   Prescription/Discharge Order    Miscellaneous Prescription  :   Miscellaneous Prescription ; Status:   Prescribed ; Ordered As Mnemonic:   ACCU-CHEK GUIDE TEST STRIPS 100S ; Simple Display Line:   See Instructions, TEST 7 TIMES PER WEEK AS DIRECTED, 100 unknown unit, 0 Refill(s) ; Ordering Provider:   Carlitos Espinosa MD; Catalog Code:   Miscellaneous Prescription ; Order Dt/Tm:   7/23/2019  1:27:02 PM CDT          Miscellaneous Prescription  :   Miscellaneous Prescription ; Status:   Prescribed ; Ordered As Mnemonic:   ACCU-CHEK FASTCLIX LANCETS 102'S ; Simple Display Line:   See Instructions, TEST 7 TIMES PER WEEK AS DIRECTED, 102 unknown unit, 0 Refill(s) ; Ordering Provider:   Carlitos Espinosa MD; Catalog Code:   Miscellaneous Prescription ; Order Dt/Tm:   7/23/2019 1:26:46 PM CDT          Miscellaneous Rx Supply  :   Miscellaneous Rx Supply ; Status:   Prescribed ; Ordered As Mnemonic:   AccuCheck Lancets ; Simple Display Line:   See Instructions, Tests 7x week, 1 box(es), 0 Refill(s) ; Ordering Provider:   Carlitos Espinosa MD; Catalog Code:   Miscellaneous Rx Supply ; Order Dt/Tm:   1/17/2019 9:43:44 AM CST          Miscellaneous Rx Supply  :   Miscellaneous Rx Supply ; Status:   Prescribed ; Ordered As Mnemonic:   AccuCheck Test Strips ; Simple Display Line:   See Instructions, Test 7x week, 1 box(es), 0 Refill(s) ; Ordering Provider:   Carlitos Espinosa MD; Catalog Code:   Miscellaneous Rx Supply ; Order Dt/Tm:   1/17/2019 9:43:44 AM CST

## 2022-02-16 NOTE — CARE COORDINATION
Pt appears on [facility] chronic disease panel as out of parameters for statin.   Pt is trying to manage diet to stay off all medications.  Pt has RTC 10/2018.

## 2022-02-16 NOTE — NURSING NOTE
Generalized Anxiety Disorder Screening Entered On:  4/9/2021 9:49 AM CDT    Performed On:  4/9/2021 9:48 AM CDT by Lisa Guzman               Generalized Anxiety Disorder Screening   BRYSON Nervous, Anxious On Edge :   Nearly every day   BRYSON Control Worrying B :   Not at all   BRYSON Worrying Too Much :   Not at all   BRYSON Trouble Relaxing :   More than half the days   BRYSON Restless :   Not at all   BRYSON Easily Annoyed/Irritable :   Not at all   BRYSON Afraid :   Not at all   BRYSON Total Screening Score :   5    BRYSON Difficulty with Work, Home, Others :   Not difficult at all   Lisa Guzman - 4/9/2021 9:48 AM CDT

## 2022-02-16 NOTE — PROGRESS NOTES
Patient:   JAMES MCCRACKEN            MRN: [id number]            FIN: [id number]               Age:   67 years     Sex:  Male     :  1950   Associated Diagnoses:   Hyperglycemia; Obesity; Family history of colon cancer in mother   Author:   Carlitos Espinosa MD      Visit Information   Visit type:  Preoperative.    Accompanied by:  No one.    Source of history:  Self.    History limitation:  None.       Chief Complaint   2018 9:38 AM CDT    discuss having a colonoscopy      History of Present Illness   The patient is here to schedule a colonoscopy.  He has not had one previously.  His mother  of colon cancer at age 58.  He is overweight but generally in good health.  He is physically active.  No history of cardiopulmonary disease.  He has never been told that he snores.  He has no daytime sleepiness.  No bleeding or anesthetic problems.    I reviewed the indication and risks of colonoscopy; specifically, the risk of organ damage or perforation, sedation problem, or bleeding.   His recent cataract surgery went well.         Review of Systems   Constitutional:  No fever, No chills, No fatigue, No decreased activity, No weight gain.    Eye:  Negative except as documented in history of present illness.    Ear/Nose/Mouth/Throat:  No nasal congestion.    Respiratory:  No shortness of breath, No cough.    Cardiovascular:  No chest pain, No palpitations, No peripheral edema.    Gastrointestinal:  Negative.    Genitourinary:  Negative.    Hematology/Lymphatics:  No bruising tendency, No bleeding tendency.    Endocrine:  No excessive thirst, No polyuria.    Neurologic:  Negative.       Health Status   Allergies:    Allergic Reactions (Selected)  No Known Medication Allergies   Problem list:    All Problems  Family history of colon cancer in mother / SNOMED CT 894690278 / Confirmed  Hyperglycemia / SNOMED CT 364043694 / Confirmed  Obesity / SNOMED CT 2025663546 / Probable  Resolved: CHEST PAIN / ICD-9-.5       Histories   Past Medical History:    Active  Obesity (0817694056)  Resolved  CHEST PAIN (786.5): Onset on 2010 at 59 years.  Resolved.  Comments:  2010 CDT 3:23 PM CDT -      Family History:    Cancer of colon  Mother (): onset at 58 .  Comments:  3/6/2018 10:57 AM - Carlitos Espinosa MD  Partial colectomy  Parkinson disease  Mother ()  CAD - Coronary artery disease  Mother (): onset at 88 .     Procedure history:    Extracapsular cataract extraction and insertion of intraocular lens (817807562) on 3/27/2018 at 67 Years.  Comments:  2018 9:41 AM - Luz Elena Dow  Right.  Extracapsular cataract extraction and insertion of intraocular lens (636322528) on 3/13/2018 at 67 Years.  Comments:  3/22/2018 2:23 PM - Luz Elena Dow  Left.  Cholecystectomy (72547500).   Social History:        Alcohol Assessment            Current                     Comments:                      2018 - Carlitos Espinosa MD                     2-3 per week      Tobacco Assessment            5-9 cigarettes (between 1/4 to 1/2 pack)/day in last 30 days      Employment and Education Assessment            Retired                     Comments:                      2018 - Carlitos Espinosa MD                     Retired [employer]      Home and Environment Assessment            Marital status:  (Living together).        Physical Examination   Vital Signs   2018 9:53 AM CDT Systolic Blood Pressure 130 mmHg    Diastolic Blood Pressure 70 mmHg    Mean Arterial Pressure 90 mmHg    BP Site Right arm    BP Method Manual   2018 9:38 AM CDT Peripheral Pulse Rate 59 bpm  LOW    Systolic Blood Pressure 135 mmHg  HI    Diastolic Blood Pressure 83 mmHg  HI    Mean Arterial Pressure 100 mmHg    BP Site Right arm      Measurements from flowsheet : Measurements   2018 9:38 AM CDT Height Measured - Standard 66.75 in    Weight Measured - Standard 238 lb    BSA 2.25 m2    Body Mass Index 37.55 kg/m2  HI       General:  Alert and oriented, No acute distress.    Eye:  Extraocular movements are intact, Normal conjunctiva.    Airway:       Mallampati classification: II (soft palate, fauces, uvula visible).    HENT:  Normocephalic, Normal hearing, Oral mucosa is moist, No pharyngeal erythema.    Neck:  Supple, Non-tender, No carotid bruit, No lymphadenopathy, No thyromegaly.    Respiratory:  Lungs are clear to auscultation, Respirations are non-labored.    Cardiovascular:  Normal rate, Regular rhythm, No murmur, No gallop, Normal peripheral perfusion, No edema.    Gastrointestinal:  Soft, Non-tender, No organomegaly.         Abdomen: Obese.    Musculoskeletal:  Normal gait.    Integumentary:  No pallor.    Neurologic:  No focal deficits.    Cognition and Speech:  Speech clear and coherent, Functional cognition intact.    Psychiatric:  Cooperative, Appropriate mood & affect.       Review / Management   Results review:  Lab results   3/6/2018 10:25 AM CST Sodium Level 137 mmol/L    Potassium Level 4.7 mmol/L    Chloride Level 103 mmol/L    CO2 Level 27 mmol/L    Glucose Level 113 mg/dL  HI    BUN 15 mg/dL    Creatinine Level 0.96 mg/dL    BUN/Creat Ratio NOT APPLICABLE    eGFR 83 mL/min/1.73m2    eGFR African American 96 mL/min/1.73m2    Calcium Level 9.2 mg/dL    Cholesterol 155 mg/dL    Non-HDL Cholesterol 117    HDL 38 mg/dL  LOW    Cholesterol/HDL Ratio 4.1    LDL 94    Triglyceride 124 mg/dL    PSA 1.4 ng/mL   .    ECG interpretation:  Time  3/6/2018 10:28:00 AM, Rate  59  beats per minute, Within normal limits.       Impression and Plan   Diagnosis     Hyperglycemia (GIF78-JY R73.9).     Obesity (CDT38-AU E66.9).     Family history of colon cancer in mother (RIW61-CN Z80.0).     Course:  Unchanged.    Patient Instructions:       Counseled: Patient, Diet, Activity, Verbalized understanding, Weight loss, Recommended colonoscopy for colon cancer screening given family history.    Summary:  Stable for colonoscopy with IV  sedation.    Orders     Orders (Selected)   Outpatient Orders  Ordered (In Transit)  Glucose* ([facility]): Specimen Type: Serum, Collection Date: 04/17/18 9:54:00 CDT  Hemoglobin A1c* ([facility]): Specimen Type: Blood, Collection Date: 04/17/18 9:54:00 CDT.

## 2022-02-16 NOTE — LETTER
(Inserted Image. Unable to display)       [address] [place] [address]  June 30, 2021      JAMES MCCRACKEN      [address] [place] [address]        Dear JAMES,    Thank you for selecting [facility] for your healthcare needs.  Below you will find the results of your recent tests done at our clinic.     Excellent results.      Result Name Current Result Previous Result Reference Range   Cholesterol (mg/dL)  102 6/25/2021  165 4/6/2021  - <200   Non-HDL Cholesterol  64 6/25/2021  124 4/6/2021  - <130   HDL (mg/dL) ((L)) 38 6/25/2021  41 4/6/2021 > OR = 40 -    Cholesterol/HDL Ratio  2.7 6/25/2021  4.0 4/6/2021  - <5.0   LDL  49 6/25/2021 ((H)) 101 4/6/2021    Triglyceride (mg/dL)  70 6/25/2021  130 4/6/2021  - <150       Please contact me or my assistant at [phone] if you have any questions.     Sincerely,        Carlitos Espinosa MD

## 2022-02-16 NOTE — TELEPHONE ENCOUNTER
Order faxed to  [facility], they will contact patient to schedule.

## 2022-02-16 NOTE — PROGRESS NOTES
Patient:   JAMES MCCRACKEN            MRN: [id number]            FIN: [id number]               Age:   67 years     Sex:  Male     :  1950   Associated Diagnoses:   Type 2 diabetes mellitus with hemoglobin A1c goal of less than 7.0%; Obesity   Author:   Trish Padilla      Visit Information   Visit type:  Diabetes Self Management Education .    Referral source:  Carlitos Espinosa MD.       Chief Complaint   Diabetes Self Management Education       History of Present Illness   Discussed nutrition, diabetes, and lifestyle management with pt.  New dx of DM Type II with a1c of 7.2% = 160 eAG - no symptoms of diabetes.  Pt is motivated to control diabetes through lifestyle and would like to avoid medication.    Nutrition:  am is life cereal with 2% milk or pancakes with kaye and syrup, afternoon is leftovers protein and starch, evening will occ have salad, does not eat fruit or vegetables daily will have cheetios or cookie in the evening, has 2-3 cups of coffee with sugar in it throughout the day  Physical Activity:  summer goes to his cabin often   Stress:   low   Sleep: good   Support: wife  BG Testing: education today   DM related medication: none at this time   Routine diabetes care:  will discuss during follow up consults        Health Status   Allergies:    Allergic Reactions (Selected)  No Known Medication Allergies   Problem list:    All Problems  Family history of colon cancer in mother / SNOMED CT 976349269 / Confirmed  Hyperglycemia / SNOMED CT 010886680 / Confirmed  Obesity / SNOMED CT 5681453422 / Probable  Type 2 diabetes mellitus with hemoglobin A1c goal of less than 7.0% / SNOMED CT 983121160 / Confirmed      Histories   Past Medical History:    Active  Obesity (8604112832)  Resolved  CHEST PAIN (786.5): Onset on 2010 at 59 years.  Resolved.  Comments:  2010 CDT 3:23 PM CDT -      Family History:    Cancer of colon  Mother (): onset at 58 .  Comments:  3/6/2018 10:57 AM - Jesús MORENO,  Carlitos  Partial colectomy  Parkinson disease  Mother ()  CAD - Coronary artery disease  Mother (): onset at 88 .     Procedure history:    Extracapsular cataract extraction and insertion of intraocular lens (SNOMED CT 355037877) performed by Glenroy Blackburn MD on 3/27/2018 at 67 Years.  Comments:  2018 9:41 AM - Victor M  Luz Elena  Right.  Extracapsular cataract extraction and insertion of intraocular lens (SNOMED CT 367399905) performed by Glenroy Blackburn MD on 3/13/2018 at 67 Years.  Comments:  3/22/2018 2:23 PM - Luz Elena Dow  Left.  Cholecystectomy (SNOMED CT 89651394).   Social History:        Alcohol Assessment            Current                     Comments:                      2018 - Carlitos Espinosa MD                     2-3 per week      Tobacco Assessment            5-9 cigarettes (between 1/4 to 1/2 pack)/day in last 30 days      Employment and Education Assessment            Retired                     Comments:                      2018 - Carlitos Espinosa MD                     Retired       Home and Environment Assessment            Marital status:  (Living together).        Physical Examination   Measurements from flowsheet : Measurements   2018 9:43 PM CDT Height Measured - Standard 66.75 in    Weight Measured - Standard 242 lb    BSA 2.27 m2    Body Mass Index 38.18 kg/m2  HI         Health Maintenance      Recommendations     Pending (in the next year)        Due            Abdominal Aortic Aneurysm Screen (if hx of smoking) due  18  One-time only           Alcohol Misuse Screen (Male) due  18  and every 1  year(s)           Aspirin Therapy for Prevention of CVD (Male) due  18  and every 5  year(s)           Colorectal Cancer Screen (Colonoscopy) (Male) due  18  and every 10  year(s)           Colorectal Cancer Screen (Sigmoidoscopy) (Male) due  18  Variable frequency           DM - Communication with Managing Provider due   04/26/18  and every 1  year(s)           DM - Eye Exam due  04/26/18  and every 1  year(s)           DM - Foot Exam due  04/26/18  and every 1  year(s)           DM - Microalbumin due  04/26/18  and every 1  year(s)           Depression Screen (Male) due  04/26/18  and every 1  year(s)           Fall Risk Screen (Male) due  04/26/18  and every 1  year(s)           Hepatitis C Screen 2016-8201 (Male) due  04/26/18  One-time only           Influenza Vaccine due  04/26/18  and every 1  year(s)           Lung Cancer Screen (Male) due  04/26/18  and every 1  year(s)           Pneumococcal Vaccine due  04/26/18  One-time only           Tetanus Vaccine due  04/26/18  and every 10  year(s)           Zoster/Shingles Vaccine due  04/26/18  One-time only        Due In Future            DM - HgbA1c not due until  07/17/18  and every 3  month(s)           Lipid Disorders Screen (Male) not due until  03/06/19  and every 1  year(s)           Tobacco Use Screen (Male) not due until  04/17/19  and every 1  year(s)           High Blood Pressure Screen (Male) not due until  04/17/19  and every 1  year(s)           Type 2 Diabetes Mellitus Screen (Male) not due until  04/17/19  and every 1  year(s)           Body Mass Index Check (Male) not due until  04/25/19  and every 1  year(s)           Obesity Screen and Counseling (Male) not due until  04/25/19  and every 1  year(s)     Satisfied (in the past 1 year)        Satisfied            Body Mass Index Check (Male) on  04/25/18.           Body Mass Index Check (Male) on  04/17/18.           Body Mass Index Check (Male) on  03/06/18.           DM - HgbA1c on  04/17/18.           High Blood Pressure Screen (Male) on  04/17/18.           High Blood Pressure Screen (Male) on  04/17/18.           High Blood Pressure Screen (Male) on  03/06/18.           High Blood Pressure Screen (Male) on  03/06/18.           Lipid Disorders Screen (Male) on  03/06/18.           Lipid Disorders Screen (Male)  on  03/06/18.           Lipid Disorders Screen (Male) on  03/06/18.           Lipid Disorders Screen (Male) on  03/06/18.           Obesity Screen and Counseling (Male) on  04/25/18.           Obesity Screen and Counseling (Male) on  04/17/18.           Obesity Screen and Counseling (Male) on  03/06/18.           Tobacco Use Screen (Male) on  04/17/18.           Tobacco Use Screen (Male) on  03/06/18.           Type 2 Diabetes Mellitus Screen (Male) on  04/17/18.        Canceled            Colorectal Cancer Screen (Occult Blood) (Male) on  04/19/18.           HIV Screen (if sexually active) (Male) on  04/19/18.           STD Counseling (if sexually active) (Male) on  04/19/18.           Syphilis Screen (if sexually active) (Male) on  04/19/18.        Review / Management   Results review:  Lab results   4/17/2018 10:05 AM CDT Glucose Level 109 mg/dL  HI    Hgb A1c 7.2  HI   3/6/2018 10:25 AM CST Sodium Level 137 mmol/L    Potassium Level 4.7 mmol/L    Chloride Level 103 mmol/L    CO2 Level 27 mmol/L    Glucose Level 113 mg/dL  HI    BUN 15 mg/dL    Creatinine 0.96 mg/dL    BUN/Creat Ratio NOT APPLICABLE    eGFR 83 mL/min/1.73m2    eGFR African American 96 mL/min/1.73m2    Calcium Level 9.2 mg/dL    Cholesterol 155 mg/dL    Non-    HDL 38 mg/dL  LOW    Chol/HDL Ratio 4.1    LDL 94    Triglyceride 124 mg/dL    PSA 1.4 ng/mL   .       Impression and Plan   Diagnosis     Type 2 diabetes mellitus with hemoglobin A1c goal of less than 7.0% (CRA72-JB E11.9).     Obesity (BHN81-YF E66.9).     Counseled: Patient, [id number] Self Care Behaviors   Today the patient was instructed on the basic physiology of diabetes mellitus, BG testing, and lifestyle guidelines.  Healthy Eating - Education on what foods are primary sources of carbohydrates and how intake affects BG readings, edu on carbohydrate counting, reading food labels, appropriate portion sizes, well balanced meals, diabetic plate method as a general rule.  Patient is  provided with numerous ideas to incorporate dietary recommendations, meal planning and preparation, mindful eating techniques and weight loss tips.    Being Active - Education on how exercise helps with :    - lowering BG by increasing the muscles ability to take up and use glucose   - weight loss   - healthier heart (improve lipid profile)   - improve sleep, mood, energy   - decrease stress      Monitoring - Today the patient is instructed on home blood glucose monitoring.  Pt is provided with a meter.  Pt is instructed on the proper use of the meter, recommended testing schedule and blood glucose target levels.  The patient is able to return appropriate demonstration of the use of the meter.  Pt is instructed on proper disposal of lancets.    Taking Medication - Will not start on medications at this time.  Discussed natural progression of diabetes and potential to add medication in the future  Problem Solving - medical support team assistance, resources provided (written and apps, internet); good control of stress  Healthy Coping - support of friends, family, and medical support team   Reducing Risks - Will discuss at f/u apts.  Weight loss goal of 7 - 10% of current body weight pounds as a reasonable goal to help increase insulin sensitivity   .      Goals:   1.  Practice healthy stress management and get good quality sleep with the goal of 7-8 hours per night.    2.   Physical activity: Recommend increasing to 2 hrs and 30 min a week (150 minutes) of moderate-intensity, or 1 hr and 15 min (75 minutes) a week of vigorous-intensity aerobic physical activity, or an equivalent combination of moderate- and vigorous-intensity aerobic physical activity.  Aerobic activity should be performed in episodes of at least 10 minutes, preferably spread throughout the week.  Muscle-strengthening activities on 2 or more days per week.    3.  Eat in a healthy way, per diabetic plate method.  Nutrition reference: Eat 3 meals a day;  snacks are optional.  A meal is three or more food groups; make it colorful for better nutrition.    4.  Total Carbohydrate intake 30 grams for meals, snacks ~ 15 grams  5.  Pt to monitor BG BID on 3 days/ week.  BG goals: Fasting and before meals 80 - 120 mg/dL, 2 hrs after the start of a meal 80 - 140 mg/dL.    6.  Goal weight  218 by 10/2018  7.  Read handouts provided.  F/u in 4-6 weeks       Professional Services   Time spent with pt 60 min   kenzie PERSON

## 2022-02-16 NOTE — NURSING NOTE
Quick Intake Entered On:  2/25/2020 9:08 AM CST    Performed On:  2/25/2020 9:08 AM CST by Cherry Alcala RN               Summary   Chief Complaint :   BP   Systolic Blood Pressure :   108 mmHg   Diastolic Blood Pressure :   62 mmHg   Mean Arterial Pressure :   77 mmHg   BP Site :   Right arm   BP Method :   Manual   Cherry Alcala RN - 2/25/2020 9:08 AM CST

## 2022-02-16 NOTE — TELEPHONE ENCOUNTER
Entered by Gail Iyer CMA on June 16, 2021 10:22:58 AM CDT  ---------------------  From: Gail Iyer CMA   To: [facility] #[id number]    Sent: 6/16/2021 10:22:58 AM CDT  Subject: Medication Management     ** Not Approved: Patient has requested refill too soon, #90 sent 4/9/21 - due now for fasting labs **  simvastatin (SIMVASTATIN 40MG TABLETS)  TAKE 1 TABLET BY MOUTH AT BEDTIME  Qty:  90 tab(s)        Days Supply:  90        Refills:  0          Substitutions Allowed     Route To Pharmacy - [facility] #[id number]   Signed by Gail Iyer CMA            ------------------------------------------  From: [facility] #[id number]  To: Carlitos Espinosa MD  Sent: June 16, 2021 8:32:25 AM CDT  Subject: Medication Management  Due: June 4, 2021 8:25:53 PM CDT     ** On Hold Pending Signature **     Dispensed Drug: simvastatin (simvastatin 40 mg oral tablet), TAKE 1 TABLET BY MOUTH AT BEDTIME  Quantity: 90 tab(s)  Days Supply: 90  Refills: 0  Substitutions Allowed  Notes from Pharmacy:  ------------------------------------------

## 2022-02-16 NOTE — LETTER
(Inserted Image. Unable to display)     July 08, 2019      JAMES MCCRACKEN  [address], [place] [id number]          Dear JAMES,      Thank you for selecting [facility] (previously [facility] [place] 2[facility] & [facility]) for your healthcare needs.    Our records indicate you are due for the following services:    Diabetic Exam ~ Please bring your glucose meter and/or your blood glucose diary to your appointment.    Fasting Lab Tests ~ Please do not eat or drink anything 10 hours prior to your scheduled appointment time.  (Water and any medications that you may need are allowed unless directed otherwise.)    If you had your labs done at another facility or with Direct Access Lab Testing at [facility], please bring in a copy of the results to your next visit, mail a copy, or drop off a copy of your results to your Healthcare Provider.    You are due for lab work and an office visit; please schedule the lab appointment 1 week before the office visit.  This will assure all results are available to discuss with your provider during your visit.    **It is very helpful if you bring your medication bottles to your appointment.  This assures we have all of your current medications, including strength and dosing information, documented accurately in your medical record.    To schedule an appointment or if you have further questions, please contact your primary clinic:   [facility]       [phone]   [facility]       [phone]              [facility]     [phone]      Powered by [employer]    Sincerely,    Carlitos Espinosa MD, FACP

## 2022-02-16 NOTE — LETTER
(Inserted Image. Unable to display)   June 16, 2021  AJMES MCCRACKEN  [address] [place] [address]        Dear JAMES,    Thank you for selecting [facility] for your healthcare needs.    Our records indicate you are due for the following services:     Fasting Lab Tests ~ Please do not eat or drink anything 10 hours prior to your scheduled appointment time.  (Water and any medications that you may need are allowed unless directed otherwise.)     If you had your labs done at another facility or with Direct Access Lab Testing at [facility], please bring in a copy of the results to your next visit, mail a copy, or drop off a copy of your results to your Healthcare Provider.     (FYI   Regarding office visits: In some instances, a video visit or telephone visit may be offered as an option.)    To schedule an appointment or if you have further questions, please contact your clinic at [phone].    Powered by [employer] and [employer]    Sincerely,    Carlitos Espinosa MD, FACP

## 2022-02-16 NOTE — PROGRESS NOTES
Patient:   JAMES MCCRACKEN            MRN: [id number]            FIN: [id number]               Age:   67 years     Sex:  Male     :  1950   Associated Diagnoses:   Prostate cancer screening; Obesity; Cataract, bilateral; Family history of colon cancer in mother; Elevated BP without diagnosis of hypertension   Author:   Carlitos Espinosa MD      Visit Information   Visit type:  Preoperative.    Accompanied by:  No one.    Source of history:  Self.    History limitation:  None.       Chief Complaint   3/6/2018 9:46 AM CST     Pre-op, bilateral cateract surgery on 3/13/18 at [facility].      History of Present Illness   This patient is a 67-year-old retired  here for preop evaluation for cataract surgery.  His general health is good.  He has good functional capacity.  He is an outdoorsman.  He cross country skis.  No history of cardiopulmonary disease.  He is not sure if he snores.  Denies daytime sleepiness.  No history of surgical bleeding or anesthetic problems.  His only surgery was a cholecystectomy eight years ago.  I have reviewed colon cancer and prostate cancer screening options with the patient.  He would like to have his PSA tested.  He has a first degree relative in his mother with colon cancer younger than 60 and colonoscopy is indicated.          Review of Systems   Constitutional:  No fever, No chills, No fatigue, No decreased activity, No weight gain.    Eye:  Negative except as documented in history of present illness.    Ear/Nose/Mouth/Throat:  No nasal congestion.    Respiratory:  No shortness of breath, No cough.    Cardiovascular:  No chest pain, No palpitations, No peripheral edema.    Gastrointestinal:  Negative.    Genitourinary:  Negative.    Hematology/Lymphatics:  No bruising tendency, No bleeding tendency.    Endocrine:  No excessive thirst, No polyuria.    Neurologic:  Negative.       Health Status   Allergies:    Allergic Reactions (Selected)  No known allergies   Problem list:    All  Problems  Family history of colon cancer in mother / SNOMED CT 176516035 / Confirmed  Obesity / SNOMED CT 6371842041 / Probable      Histories   Past Medical History:    Resolved  CHEST PAIN (786.5): Onset on 2010 at 59 years.  Resolved.  Comments:  2010 CDT 3:23 PM CDT -      Family History:    CAD - Coronary artery disease  Mother (): onset at 88 .     Procedure history:    Cholecystectomy (00626567).   Social History:        Alcohol Assessment            Current                     Comments:                      2018 - Carlitos Espinosa MD                     2-3 per week      Tobacco Assessment            5-9 cigarettes (between 1/4 to 1/2 pack)/day in last 30 days      Employment and Education Assessment            Retired                     Comments:                      2018 - Carlitos Espinosa MD                     Retired       Home and Environment Assessment            Marital status:  (Living together).        Physical Examination   Vital Signs   3/6/2018 10:08 AM CST Systolic Blood Pressure 138 mmHg  HI    Diastolic Blood Pressure 72 mmHg    Mean Arterial Pressure 94 mmHg   3/6/2018 9:46 AM CST Temperature Tympanic 98.4 DegF    Peripheral Pulse Rate 60 bpm    Pulse Site Radial artery    HR Method Manual    Systolic Blood Pressure 158 mmHg  HI    Diastolic Blood Pressure 92 mmHg  HI    Mean Arterial Pressure 114 mmHg    BP Site Right arm    BP Method Manual      Measurements from flowsheet : Measurements   3/6/2018 9:46 AM CST Height Measured - Standard 66.75 in    Weight Measured - Standard 239.8 lb    BSA 2.26 m2    Body Mass Index 37.84 kg/m2  HI      General:  Alert and oriented, No acute distress.    Eye:  Extraocular movements are intact, Normal conjunctiva.    Airway:       Mallampati classification: I (soft palate, fauces, uvula, pillars visible).    HENT:  Normocephalic, Normal hearing, Oral mucosa is moist, No pharyngeal erythema.    Neck:  Supple,  Non-tender, No carotid bruit, No lymphadenopathy, No thyromegaly.    Respiratory:  Lungs are clear to auscultation, Respirations are non-labored.    Cardiovascular:  Normal rate, Regular rhythm, No murmur, No gallop, Good pulses equal in all extremities, Normal peripheral perfusion, No edema.    Gastrointestinal:  Soft, Non-tender, No organomegaly.         Abdomen: Obese.    Musculoskeletal:  Normal gait.    Integumentary:  No pallor.    Neurologic:  No focal deficits.    Cognition and Speech:  Speech clear and coherent, Functional cognition intact.    Psychiatric:  Cooperative, Appropriate mood & affect.       Review / Management   ECG interpretation:  Time  3/6/2018 10:28:00 AM, Rate  59  beats per minute, Within normal limits.       Impression and Plan   Diagnosis     Prostate cancer screening (CVB90-IF Z12.5).     Obesity (IDX27-TY E66.9).     Cataract, bilateral (LVB59-KJ H26.9).     Family history of colon cancer in mother (UBP83-UU Z80.0).     Elevated BP without diagnosis of hypertension (IYM33-PI R03.0).     Course:  Unchanged.    Patient Instructions:       Counseled: Patient, Diet, Activity, Verbalized understanding, Weight loss, Recommended colonoscopy for colon cancer screening given family history.    Summary:  Stable for cataract surgery.    Orders     Orders (Selected)   Outpatient Orders  Ordered (In Transit)  Basic Metabolic Panel* ([facility]): Specimen Type: Serum, Collection Date: 03/06/18 10:09:00 CST  Lipid panel with reflex to direct ldl* ([facility]): Specimen Type: Serum, Collection Date: 03/06/18 10:09:00 CST  PSA, Total (Room)* ([facility]): Specimen Type: Serum, Collection Date: 03/06/18 10:09:00 CST.     Patient will call when ready to schedule colonoscopy.

## 2022-02-16 NOTE — LETTER
(Inserted Image. Unable to display)       [address] [place] [address]  April 08, 2021      JAMES MCCRACKEN      [address] [place] [address]        Dear JAMES,    Thank you for selecting [facility] for your healthcare needs.  Below you will find the results of your recent tests done at our clinic.     Results are acceptable. LDL has increased. Please schedule an appointment.      Result Name Current Result Previous Result Reference Range   Sodium Level (mmol/L)  139 4/6/2021  139 2/25/2020 135 - 146   Potassium Level (mmol/L)  4.7 4/6/2021  4.5 2/25/2020 3.5 - 5.3   Chloride Level (mmol/L)  107 4/6/2021  105 2/25/2020 98 - 110   CO2 Level (mmol/L)  25 4/6/2021  26 2/25/2020 20 - 32   Glucose Level (mg/dL) ((H)) 116 4/6/2021 ((H)) 105 2/25/2020 65 - 99   BUN (mg/dL)  21 4/6/2021  17 2/25/2020 7 - 25   Creatinine Level (mg/dL)  1.01 4/6/2021  0.70 - 1.25   eGFR (mL/min/1.73m2)  76 4/6/2021  89 2/25/2020 > OR = 60 -    Calcium Level (mg/dL)  8.9 4/6/2021  8.9 2/25/2020 8.6 - 10.3   Hgb A1c ((H)) 6.3 4/6/2021 ((H)) 6.4 2/25/2020  - <5.7   Cholesterol (mg/dL)  165 4/6/2021  144 2/25/2020  - <200   Non-HDL Cholesterol  124 4/6/2021  108 2/25/2020  - <130   HDL (mg/dL)  41 4/6/2021 ((L)) 36 2/25/2020 > OR = 40 -    Cholesterol/HDL Ratio  4.0 4/6/2021  4.0 2/25/2020  - <5.0   LDL ((H)) 101 4/6/2021  90 2/25/2020    Triglyceride (mg/dL)  130 4/6/2021  86 2/25/2020  - <150   PSA (ng/mL)  0.8 4/6/2021  1.0 2/25/2020  - < OR = 4.0   U Creatinine (mg/dL)  100 4/6/2021  20 - 320   U Microalbumin (mg/dL)  0.2 4/6/2021  0.3 2/25/2020 See Note: -    Ur Microalbumin/Creatinine Ratio  2 4/6/2021  3 2/25/2020  - <30       Please contact me or my assistant at [phone] if you have any questions.     Sincerely,        Carlitos Espinosa MD

## 2022-02-16 NOTE — NURSING NOTE
Comprehensive Intake Entered On:  6/1/2021 11:00 AM CDT    Performed On:  6/1/2021 10:54 AM CDT by Lisa Guzman               Summary   Chief Complaint :   Medical f/u.   Weight Measured :   227.7 lb(Converted to: 227 lb 11 oz, 103.283 kg)    Height Measured :   66.75 in(Converted to: 5 ft 7 in, 169.54 cm)    Body Mass Index :   35.93 kg/m2 (HI)    Body Surface Area :   2.2 m2   Systolic Blood Pressure :   120 mmHg   Diastolic Blood Pressure :   68 mmHg   Mean Arterial Pressure :   85 mmHg   Peripheral Pulse Rate :   66 bpm   BP Site :   Right arm   BP Method :   Electronic   HR Method :   Electronic   Temperature Tympanic :   98.4 DegF(Converted to: 36.9 DegC)    Oxygen Saturation :   95 %   Lisa Guzman - 6/1/2021 10:54 AM CDT   Health Status   Allergies Verified? :   Yes   Medication History Verified? :   Yes   Medical History Verified? :   Yes   Pre-Visit Planning Status :   Completed   Tobacco Use? :   Current every day smoker   Lisa Guzman - 6/1/2021 10:54 AM CDT   Consents   Consent for Immunization Exchange :   Consent Granted   Consent for Immunizations to Providers :   Consent Granted   Lisa Guzman - 6/1/2021 10:54 AM CDT   Meds / Allergies   (As Of: 6/1/2021 11:00:45 AM CDT)   Allergies (Active)   No Known Medication Allergies  Estimated Onset Date:   Unspecified ; Created By:   Hailey Boone MA; Reaction Status:   Active ; Category:   Drug ; Substance:   No Known Medication Allergies ; Type:   Allergy ; Updated By:   Hailey Boone MA; Reviewed Date:   6/1/2021 10:57 AM CDT        Medication List   (As Of: 6/1/2021 11:00:45 AM CDT)   Prescription/Discharge Order    Miscellaneous Rx Supply  :   Miscellaneous Rx Supply ; Status:   Prescribed ; Ordered As Mnemonic:   AccuCheck Lancets ; Simple Display Line:   See Instructions, Tests 7x week, 1 box(es), 11 Refill(s) ; Ordering Provider:   Carlitos Espinosa MD; Catalog Code:   Miscellaneous Rx Supply ; Order Dt/Tm:   3/6/2020 9:37:52 AM  CST          Miscellaneous Rx Supply  :   Miscellaneous Rx Supply ; Status:   Prescribed ; Ordered As Mnemonic:   AccuCheck Test Strips ; Simple Display Line:   See Instructions, Test 7x week, 1 box(es), 11 Refill(s) ; Ordering Provider:   Carlitos Espinosa MD; Catalog Code:   Miscellaneous Rx Supply ; Order Dt/Tm:   3/6/2020 9:38:09 AM CST          rivaroxaban  :   rivaroxaban ; Status:   Prescribed ; Ordered As Mnemonic:   Xarelto 20 mg oral tablet ; Simple Display Line:   20 mg, 1 tab(s), Oral, qpm, 30 tab(s), 2 Refill(s) ; Ordering Provider:   Carlitos Espinosa MD; Catalog Code:   rivaroxaban ; Order Dt/Tm:   4/9/2021 4:33:15 PM CDT          simvastatin  :   simvastatin ; Status:   Prescribed ; Ordered As Mnemonic:   simvastatin 40 mg oral tablet ; Simple Display Line:   40 mg, 1 tab(s), Oral, hs, 90 tab(s), 0 Refill(s) ; Ordering Provider:   Carlitos Espinosa MD; Catalog Code:   simvastatin ; Order Dt/Tm:   4/9/2021 9:22:54 AM CDT            ID Risk Screen   Recent Travel History :   No recent travel   Family Member Travel History :   No recent travel   Other Exposure to Infectious Disease :   Unknown   COVID-19 Testing Status :   No COVID-19 test performed   Lisa Guzman - 6/1/2021 10:54 AM CDT

## 2022-02-16 NOTE — PROGRESS NOTES
Patient:   JAMES MCCRACKEN            MRN: [id number]            FIN: [id number]               Age:   67 years     Sex:  Male     :  1950   Associated Diagnoses:   None   Author:   Trish Padilla      Doctor: Jesús  Patient Information  (Medicare and address information is on file)  Medicare HICN#: _  Address:_ City:_ State:_ Zip:_  Home Phone:_ Work Phone:_ Other Contact Phone:_    Diabetes self-management training (DSMT) and medical nutrition therapy (MNT) are individual and complementary services to improve diabetes care. For Medicare beneficiaries, both services can be ordered in the same year. Research indicates MNT combined with DSMT improves outcomes.    Diabetes Self-Managment Training (DSMT)  Medicare: 10 hours initial DSMT in 12 month period, plus 2 hours follow-up annually  *Check type of training services and number of hours requested:  X Initial DSMT:  X 10 hours or _ no. hrs. requested  _ Follow-up DSMT: _ 2 hours or _ no. hrs. requested  _ Additional insulin training: _ no. hrs. requested    *Patients with special needs requiring individual DSMT  Check all special needs that apply:  _ Vision _ Hearing  _ Physical  _Cognitive Impairment  _ Language limitations X Other  No classes offered    *DSMT Content  X All ten content areas, as appropriate  _ Monitoring diabetes    _ Diabetes as disease process  _ Psychological adjustment _ Physical activity  _ Nutritional management  _ Goal setting, problem solving  _ Medications       _ Prevent, detect and treat acute complications  _ Preconception/pregnancy _ Prevent, detect and teat chronic complications     management or gestational     diabetes management    Medical Nutrition Therapy (MNT)  Medicare: 3 hours initial MNT in the first calendar year, plus two hours follow-up MNT annually. Additional MNT hours available for change in medical condition, treament and/or diagnosis.  *Check the type of MNT and/or number of additional hours requested:  X   Initial MNT:   _ Annual follow-up MNT  _ Additional MNT services in the same calendar year, per RD recommendations  _ number of additional hours requested    Please specify change in medical condition, treatment and/or diagnosis      *Diagnosis  Please send recent labs for patient eligibility & outcomes monitoring  _ Type 1 uncontrolled  _ Type 1 controlled  _ Type 2 uncontrolled  X Type 2 controlled  _ Gestational diabetes _ Other _    Complications/Comorbidities  Check all that apply:  _ Hypertension   _ Dyslipidemia   _ Nephropathy   _ Stroke  _ Neuropathy   _ Retinopathy  _ Renal disease   _ CHD  _ PVD      _ Pregnancy  _ Non-healing wound X Obesity    _ Mental/affective disorder  _ Other _    Current Diabetes Medications  Specify type, dose and frequency  Oral: _  Insulin:   Patient now uses: _ Pen _ Needle _ Pump    Patient Behavior Goals/Plan of Care    To gradually lose weight and improve blood sugar control.  Minimize the risk for hypoglycemia and reduce risks of developing complications related to uncontrolled diabetes.    Signature and UPIN #: (UPIN and NPI are on file)  Group/Practice name, address and phone: [facility], [address] [place] [address]  [phone]

## 2022-02-16 NOTE — NURSING NOTE
"Comprehensive Intake Entered On:  4/9/2021 9:01 AM CDT    Performed On:  4/9/2021 8:58 AM CDT by Shabnam Colindres LPN               Summary   Chief Complaint :   here today for \"regular check up\"   Weight Measured :   230.6 lb(Converted to: 230 lb 10 oz, 104.598 kg)    Height Measured :   66.75 in(Converted to: 5 ft 7 in, 169.54 cm)    Body Mass Index :   36.38 kg/m2 (HI)    Body Surface Area :   2.22 m2   Systolic Blood Pressure :   118 mmHg   Diastolic Blood Pressure :   78 mmHg   Mean Arterial Pressure :   91 mmHg   Peripheral Pulse Rate :   105 bpm (HI)    BP Site :   Right arm   BP Method :   Manual   Temperature Tympanic :   97.8 DegF(Converted to: 36.6 DegC)  (LOW)    Oxygen Saturation :   95 %   Shabnam Colindres LPN - 4/9/2021 8:58 AM CDT   Health Status   Allergies Verified? :   Yes   Medication History Verified? :   Yes   Medical History Verified? :   No   Pre-Visit Planning Status :   Completed   Tobacco Use? :   Current every day smoker   Shabnam Colindres LPN - 4/9/2021 8:58 AM CDT   Meds / Allergies   (As Of: 4/9/2021 9:01:49 AM CDT)   Allergies (Active)   No Known Medication Allergies  Estimated Onset Date:   Unspecified ; Created By:   Hailey Boone MA; Reaction Status:   Active ; Category:   Drug ; Substance:   No Known Medication Allergies ; Type:   Allergy ; Updated By:   Hailey Boone MA; Reviewed Date:   3/6/2020 9:15 AM CST        Medication List   (As Of: 4/9/2021 9:01:49 AM CDT)   Prescription/Discharge Order    Miscellaneous Rx Supply  :   Miscellaneous Rx Supply ; Status:   Prescribed ; Ordered As Mnemonic:   AccuCheck Lancets ; Simple Display Line:   See Instructions, Tests 7x week, 1 box(es), 11 Refill(s) ; Ordering Provider:   Carlitos Espinosa MD; Catalog Code:   Miscellaneous Rx Supply ; Order Dt/Tm:   3/6/2020 9:37:52 AM CST          Miscellaneous Rx Supply  :   Miscellaneous Rx Supply ; Status:   Prescribed ; Ordered As Mnemonic:   AccuCheck Test Strips ; Simple Display Line:   See " Instructions, Test 7x week, 1 box(es), 11 Refill(s) ; Ordering Provider:   Carlitos Espinosa MD; Catalog Code:   Miscellaneous Rx Supply ; Order Dt/Tm:   3/6/2020 9:38:09 AM CST            ID Risk Screen   Recent Travel History :   No recent travel   Family Member Travel History :   No recent travel   Other Exposure to Infectious Disease :   Unknown   COVID-19 Testing Status :   No COVID-19 test performed   Shabnam Colindres LPN - 4/9/2021 8:58 AM CDT   Social History   Social History   (As Of: 4/9/2021 9:01:49 AM CDT)   Alcohol:        Current   Comments:  3/6/2018 10:05 AM - Carlitos Espinosa MD: 2-3 per week   (Last Updated: 3/6/2018 10:05:41 AM CST by Carlitos Espinosa MD)          Tobacco:        4 or less cigarettes(less than 1/4 pack)/day in last 30 days   (Last Updated: 4/9/2021 8:58:41 AM CDT by Shabnam Colindres LPN)          Electronic Cigarette/Vaping:        Electronic Cigarette Use: Never.   (Last Updated: 4/9/2021 8:58:45 AM CDT by Shabnam Colindres LPN)          Employment/School:        Retired   Comments:  3/6/2018 10:05 AM - Carlitos Espinosa MD: Retired    (Last Updated: 3/6/2018 10:05:41 AM CST by Carlitos Espinosa MD)          Home/Environment:        Marital status:  (Living together).   (Last Updated: 3/6/2018 10:05:41 AM CST by Carlitos Espinosa MD)

## 2022-02-16 NOTE — LETTER
(Inserted Image. Unable to display)   January 22, 2019      JAMES MCCRACKEN      [address], [place] [id number]        Dear JAMES,    Thank you for selecting [facility] ([facility] for your healthcare needs.  Below you will find the results of your recent tests done at our clinic.    Excellent results.      Result Name Current Result Previous Result Reference Range   Hgb A1c ((H)) 6.2 1/21/2019 ((H)) 7.2 4/17/2018  - <5.7   U Microalbumin (mg/dL)  0.2 1/21/2019  See Note: -        Please contact me or my assistant at [phone] if you have any questions.     Sincerely,        Carlitos Espinosa MD

## 2022-02-16 NOTE — PROGRESS NOTES
Chief Complaint    Medical f/u.  History of Present Illness       Iker is a 70-year-old with a history of smoking 4 to 5 cigarettes/day, dyslipidemia, and diet-controlled type 2 diabetes who was found to have atrial flutter at a recent wellness visit.  He notes only easy bleeding on Xarelto.  He is tolerating statin which she had in the past.  No palpitations, chest pain, dyspnea, cough, edema.  Review of Systems       No headache, myalgia, abdominal pain, diarrhea.  Physical Exam   Vitals & Measurements    T: 98.4  F (Tympanic)  HR: 66 (Peripheral)  BP: 120/68  SpO2: 95%     HT: 66.75 in  WT: 227.7 lb  BMI: 35.93        Patient appears comfortable.  Alert and oriented.  Chest clear.  Cardiac exam is regular no murmur or edema.  Abdomen nontender.  Assessment/Plan       Atrial flutter (I48.92)          Seems to have spontaneously converted.  EKG to confirm.  Work-up negative.  Still needs an echo.  Cardiology consultation.         Ordered:          21349 ecg routine ecg w/least 12 lds w/i+r (Charge), Quantity: 1, Atrial flutter          75932 office o/p est mod 30-39 min (Charge), Quantity: 1, Atrial flutter  Dyslipidemia  Current tobacco use  Type 2 diabetes mellitus with hemoglobin A1c goal of less than 7.0%          Referral (Request), 06/01/21 11:10:00 CDT, Referred to: Cardiology, Referred to: [facility], Reason for referral: Atrial flutter, Atrial flutter                Current tobacco use (Z72.0)          Encouraged the patient to discontinue but not ready to do so.         Ordered:          78475 office o/p est mod 30-39 min (Charge), Quantity: 1, Atrial flutter  Dyslipidemia  Current tobacco use  Type 2 diabetes mellitus with hemoglobin A1c goal of less than 7.0%                Dyslipidemia (E78.5)          Tolerating statin.  Repeat lipids.         Ordered:          64336 office o/p est mod 30-39 min (Charge), Quantity: 1, Atrial flutter  Dyslipidemia  Current tobacco use  Type 2 diabetes mellitus  with hemoglobin A1c goal of less than 7.0%                Type 2 diabetes mellitus with hemoglobin A1c goal of less than 7.0% (E11.9)          Controlled.         Ordered:          79229 office o/p est mod 30-39 min (Charge), Quantity: 1, Atrial flutter  Dyslipidemia  Current tobacco use  Type 2 diabetes mellitus with hemoglobin A1c goal of less than 7.0%                Orders:         Return to Clinic (Request), RFV: lipids, Return in June 2021  Patient Information     Name:JAMES MCCRACKEN      Address:      [address] [place] [phone]     Sex:Male     YOB: 1950     [phone]     Emergency Contact:CHRISTINA MCCRACKEN     MRN:[id number]     FIN:[id number]     Location:[facility]     Date of Service:06/01/2021      Primary Care Physician:       Carlitos Espinosa MD, [phone]      Attending Physician:       Carlitos Espinosa MD, [phone]  Problem List/Past Medical History    Ongoing     Atrial flutter     Current tobacco use     Dyslipidemia     Family history of colon cancer in mother     Obesity     Type 2 diabetes mellitus with hemoglobin A1c goal of less than 7.0%    Historical     CHEST PAIN     Refusal of statin medication by patient  Procedure/Surgical History     Colonoscopy (05/23/2018)      Comments: Indication: Family History of colon cancer      Sedation:Fentanyl 150 mcg, Versed 2 mg      Findings: diverticulosis and one hyperplastic polyp      Rec: Repeat in 5 years.     Extracapsular cataract extraction and insertion of intraocular lens (03/27/2018)      Comments: Right..     Extracapsular cataract extraction and insertion of intraocular lens (03/13/2018)      Comments: Left..     Cholecystectomy  Medications    AccuCheck Lancets, See Instructions, 11 refills    AccuCheck Test Strips, See Instructions, 11 refills    simvastatin 40 mg oral tablet, 40 mg= 1 tab(s), Oral, hs    Xarelto 20 mg oral tablet, 20 mg= 1 tab(s), Oral, qpm, 2  refills  Allergies    No Known Medication Allergies  Social History    Smoking Status     Current every day smoker     Alcohol      Current     Electronic Cigarette/Vaping      Electronic Cigarette Use: Never.     Employment/School      Retired     Home/Environment      Marital status:  (Living together).     Tobacco      4 or less cigarettes(less than 1/4 pack)/day in last 30 days  Family History    CAD - Coronary artery disease: Mother (Dx at 88).    Cancer of colon: Mother (Dx at 58).    Parkinson disease: Mother.  Lab Results          Lab Results (Last 4 results within 90 days)           Sodium Level: 139 mmol/L [135 mmol/L - 146 mmol/L] (04/06/21 10:00:00)          Potassium Level: 4.7 mmol/L [3.5 mmol/L - 5.3 mmol/L] (04/06/21 10:00:00)          Chloride Level: 107 mmol/L [98 mmol/L - 110 mmol/L] (04/06/21 10:00:00)          CO2 Level: 25 mmol/L [20 mmol/L - 32 mmol/L] (04/06/21 10:00:00)          Glucose Level: 116 mg/dL High [65 mg/dL - 99 mg/dL] (04/06/21 10:00:00)          BUN: 21 mg/dL [7 mg/dL - 25 mg/dL] (04/06/21 10:00:00)          Creatinine Level: 1.01 mg/dL [0.7 mg/dL - 1.25 mg/dL] (04/06/21 10:00:00)          BUN/Creat Ratio: NOT APPLICABLE [6  - 22] (04/06/21 10:00:00)          eGFR: 76 mL/min/1.73m2 (04/06/21 10:00:00)          eGFR African American: 88 mL/min/1.73m2 (04/06/21 10:00:00)          Calcium Level: 8.9 mg/dL [8.6 mg/dL - 10.3 mg/dL] (04/06/21 10:00:00)          Hgb A1c: 6.3 High (04/06/21 10:00:00)          Cholesterol: 165 mg/dL (04/06/21 10:00:00)          Non-HDL Cholesterol: 124 (04/06/21 10:00:00)          HDL: 41 mg/dL (04/06/21 10:00:00)          Cholesterol/HDL Ratio: 4 (04/06/21 10:00:00)          LDL: 101 High (04/06/21 10:00:00)          Triglyceride: 130 mg/dL (04/06/21 10:00:00)          TSH: 4.23 mIU/L [0.4 mIU/L - 4.5 mIU/L] (04/09/21 09:43:00)          PSA: 0.8 ng/mL (04/06/21 10:00:00)          U Creatinine: 100 mg/dL [20 mg/dL - 320 mg/dL] (04/06/21  10:00:00)          U Microalbumin: 0.2 mg/dL (04/06/21 10:00:00)          Ur Microalbumin/Creatinine Ratio: 2 (04/06/21 10:00:00)          WBC: 6.6 [3.8  - 10.8] (04/09/21 09:43:00)          RBC: 5.04 [4.2  - 5.8] (04/09/21 09:43:00)          Hgb: 14.9 gm/dL [13.2 gm/dL - 17.1 gm/dL] (04/09/21 09:43:00)          Hct: 43.7 % [38.5 % - 50 %] (04/09/21 09:43:00)          MCV: 86.7 fL [80 fL - 100 fL] (04/09/21 09:43:00)          MCH: 29.6 pg [27 pg - 33 pg] (04/09/21 09:43:00)          MCHC: 34.1 gm/dL [32 gm/dL - 36 gm/dL] (04/09/21 09:43:00)          RDW: 13.1 % [11 % - 15 %] (04/09/21 09:43:00)          Platelet: 238 [140  - 400] (04/09/21 09:43:00)          MPV: 9.5 fL [7.5 fL - 12.5 fL] (04/09/21 09:43:00)  Immunizations          Other Immunizations          Administration Dosage Date(s)          influenza virus vaccine, inactivated          03/06/2020          pneumococcal (PPSV23)          03/06/2020

## 2022-03-29 ENCOUNTER — DOCUMENTATION ONLY (OUTPATIENT)
Dept: LAB | Facility: CLINIC | Age: 72
End: 2022-03-29
Payer: COMMERCIAL

## 2022-03-29 DIAGNOSIS — Z12.5 SCREENING FOR PROSTATE CANCER: ICD-10-CM

## 2022-03-29 DIAGNOSIS — I10 PRIMARY HYPERTENSION: ICD-10-CM

## 2022-03-29 DIAGNOSIS — E11.9 TYPE 2 DIABETES MELLITUS WITHOUT COMPLICATION, WITHOUT LONG-TERM CURRENT USE OF INSULIN (H): Primary | ICD-10-CM

## 2022-03-31 ENCOUNTER — LAB (OUTPATIENT)
Dept: LAB | Facility: CLINIC | Age: 72
End: 2022-03-31
Payer: COMMERCIAL

## 2022-03-31 DIAGNOSIS — E11.9 TYPE 2 DIABETES MELLITUS WITHOUT COMPLICATION, WITHOUT LONG-TERM CURRENT USE OF INSULIN (H): ICD-10-CM

## 2022-03-31 DIAGNOSIS — I10 PRIMARY HYPERTENSION: ICD-10-CM

## 2022-03-31 DIAGNOSIS — Z12.5 SCREENING FOR PROSTATE CANCER: ICD-10-CM

## 2022-03-31 LAB
ANION GAP SERPL CALCULATED.3IONS-SCNC: 5 MMOL/L (ref 3–14)
BUN SERPL-MCNC: 16 MG/DL (ref 7–30)
CALCIUM SERPL-MCNC: 8.9 MG/DL (ref 8.5–10.1)
CHLORIDE BLD-SCNC: 108 MMOL/L (ref 94–109)
CHOLEST SERPL-MCNC: 115 MG/DL
CO2 SERPL-SCNC: 26 MMOL/L (ref 20–32)
CREAT SERPL-MCNC: 0.84 MG/DL (ref 0.66–1.25)
CREAT UR-MCNC: 142 MG/DL
FASTING STATUS PATIENT QL REPORTED: ABNORMAL
GFR SERPL CREATININE-BSD FRML MDRD: >90 ML/MIN/1.73M2
GLUCOSE BLD-MCNC: 124 MG/DL (ref 70–99)
HBA1C MFR BLD: 6.6 % (ref 0–5.6)
HDLC SERPL-MCNC: 39 MG/DL
LDLC SERPL CALC-MCNC: 52 MG/DL
MICROALBUMIN UR-MCNC: 22 MG/L
MICROALBUMIN/CREAT UR: 15.49 MG/G CR (ref 0–17)
NONHDLC SERPL-MCNC: 76 MG/DL
POTASSIUM BLD-SCNC: 4.2 MMOL/L (ref 3.4–5.3)
PSA SERPL-MCNC: 4.22 UG/L (ref 0–4)
SODIUM SERPL-SCNC: 139 MMOL/L (ref 133–144)
TRIGL SERPL-MCNC: 120 MG/DL

## 2022-03-31 PROCEDURE — 80061 LIPID PANEL: CPT

## 2022-03-31 PROCEDURE — 83036 HEMOGLOBIN GLYCOSYLATED A1C: CPT

## 2022-03-31 PROCEDURE — 36415 COLL VENOUS BLD VENIPUNCTURE: CPT

## 2022-03-31 PROCEDURE — 80048 BASIC METABOLIC PNL TOTAL CA: CPT

## 2022-03-31 PROCEDURE — G0103 PSA SCREENING: HCPCS

## 2022-03-31 PROCEDURE — 82043 UR ALBUMIN QUANTITATIVE: CPT

## 2022-04-08 ENCOUNTER — TELEPHONE (OUTPATIENT)
Dept: FAMILY MEDICINE | Facility: CLINIC | Age: 72
End: 2022-04-08
Payer: COMMERCIAL

## 2022-04-08 DIAGNOSIS — E11.9 TYPE 2 DIABETES MELLITUS WITH HEMOGLOBIN A1C GOAL OF LESS THAN 7.0% (H): Primary | ICD-10-CM

## 2022-04-08 NOTE — TELEPHONE ENCOUNTER
LVM for patient that prescription has been refilled, patient is due for an office visit.   Nancy Tracey RN  [facility]

## 2022-04-08 NOTE — TELEPHONE ENCOUNTER
Reason for Call:  Medication or medication refill:    Do you use a [facility]?  Name of the pharmacy and phone number for the current request:  [facility] [place]    Name of the medication requested: AccuCheck Lancets and Test Strips    Other request:     Can we leave a detailed message on this number? YES    Phone number patient can be reached at: Home number on file [phone] (home)    Best Time: anytimed    Call taken on 4/8/2022 at 11:07 AM by Martha Mayes

## 2022-04-28 ENCOUNTER — MEDICAL CORRESPONDENCE (OUTPATIENT)
Dept: HEALTH INFORMATION MANAGEMENT | Facility: CLINIC | Age: 72
End: 2022-04-28
Payer: COMMERCIAL

## 2022-05-02 ENCOUNTER — MEDICAL CORRESPONDENCE (OUTPATIENT)
Dept: HEALTH INFORMATION MANAGEMENT | Facility: CLINIC | Age: 72
End: 2022-05-02
Payer: COMMERCIAL

## 2022-05-02 ENCOUNTER — TRANSFERRED RECORDS (OUTPATIENT)
Dept: HEALTH INFORMATION MANAGEMENT | Facility: CLINIC | Age: 72
End: 2022-05-02
Payer: COMMERCIAL

## 2022-05-02 LAB — RETINOPATHY: NEGATIVE

## 2022-07-12 DIAGNOSIS — I48.3 TYPICAL ATRIAL FLUTTER (H): Primary | ICD-10-CM

## 2022-07-12 DIAGNOSIS — E11.9 TYPE 2 DIABETES MELLITUS WITH HEMOGLOBIN A1C GOAL OF LESS THAN 7.0% (H): ICD-10-CM

## 2022-07-14 PROBLEM — I48.92 ATRIAL FLUTTER (H): Status: ACTIVE | Noted: 2022-07-14

## 2022-07-14 PROBLEM — E66.9 OBESITY (BMI 35.0-39.9 WITHOUT COMORBIDITY): Status: ACTIVE | Noted: 2018-05-09

## 2022-07-14 RX ORDER — SIMVASTATIN 40 MG
TABLET ORAL
Qty: 90 TABLET | Refills: 3 | Status: SHIPPED | OUTPATIENT
Start: 2022-07-14 | End: 2023-02-27

## 2022-07-14 RX ORDER — RIVAROXABAN 20 MG/1
TABLET, FILM COATED ORAL
Qty: 90 TABLET | Refills: 3 | Status: SHIPPED | OUTPATIENT
Start: 2022-07-14 | End: 2023-02-27

## 2022-07-14 RX ORDER — SIMVASTATIN 40 MG
TABLET ORAL
COMMUNITY
Start: 2022-05-03 | End: 2023-02-27

## 2022-07-14 RX ORDER — RIVAROXABAN 20 MG/1
TABLET, FILM COATED ORAL
COMMUNITY
Start: 2022-05-03 | End: 2023-02-27

## 2022-07-14 NOTE — TELEPHONE ENCOUNTER
Routing refill request to provider for review/approval because:    Last office visit 6/25/2021 TC please reach out to patient and schedule visit    Statin & Direct Oral Anticoagulant Agents Failed     Normal Platelets on file in past 12 months    Medication is active on med list    Creatinine Clearance greater than 50 ml/min on file in past 3 mos    Serum creatinine less than or equal to 1.4 on file in past 3 mos    Recent (6 mo) or future (30 days) visit within the authorizing provider's specialty     Active medications from Zoe Mcwilliams RN  [facility]

## 2023-02-14 ENCOUNTER — TELEPHONE (OUTPATIENT)
Dept: FAMILY MEDICINE | Facility: CLINIC | Age: 73
End: 2023-02-14
Payer: COMMERCIAL

## 2023-02-14 DIAGNOSIS — E11.9 TYPE 2 DIABETES MELLITUS WITH HEMOGLOBIN A1C GOAL OF LESS THAN 7.0% (H): Primary | ICD-10-CM

## 2023-02-14 DIAGNOSIS — Z12.5 SCREENING FOR PROSTATE CANCER: ICD-10-CM

## 2023-02-14 NOTE — TELEPHONE ENCOUNTER
I spoke to pt. I will have one of our schedulers reach out to him today to get him scheduled for labs

## 2023-02-14 NOTE — TELEPHONE ENCOUNTER
General Call    Contacts       Type Contact Phone/Fax    02/14/2023 09:13 AM CST Phone (Incoming) Iker Escobar (Self) [phone] (H)        Reason for Call:  Patient asking if Dr. Espinosa would like him to have labs done prior to his 2/27/23 appointment?       Please Advise    What are your questions or concerns:  See above    Date of last appointment with provider:     Okay to leave a detailed message?: Yes at Home number on file [phone] (home)

## 2023-02-17 ENCOUNTER — LAB (OUTPATIENT)
Dept: LAB | Facility: CLINIC | Age: 73
End: 2023-02-17
Payer: COMMERCIAL

## 2023-02-17 DIAGNOSIS — Z12.5 SCREENING FOR PROSTATE CANCER: ICD-10-CM

## 2023-02-17 DIAGNOSIS — E11.9 TYPE 2 DIABETES MELLITUS WITH HEMOGLOBIN A1C GOAL OF LESS THAN 7.0% (H): ICD-10-CM

## 2023-02-17 LAB
ANION GAP SERPL CALCULATED.3IONS-SCNC: 9 MMOL/L (ref 7–15)
BUN SERPL-MCNC: 13.6 MG/DL (ref 8–23)
CALCIUM SERPL-MCNC: 9.3 MG/DL (ref 8.8–10.2)
CHLORIDE SERPL-SCNC: 105 MMOL/L (ref 98–107)
CHOLEST SERPL-MCNC: 103 MG/DL
CREAT SERPL-MCNC: 0.92 MG/DL (ref 0.67–1.17)
CREAT UR-MCNC: 98.6 MG/DL
DEPRECATED HCO3 PLAS-SCNC: 27 MMOL/L (ref 22–29)
GFR SERPL CREATININE-BSD FRML MDRD: 89 ML/MIN/1.73M2
GLUCOSE SERPL-MCNC: 126 MG/DL (ref 70–99)
HBA1C MFR BLD: 6.8 % (ref 0–5.6)
HDLC SERPL-MCNC: 37 MG/DL
LDLC SERPL CALC-MCNC: 53 MG/DL
MICROALBUMIN UR-MCNC: <12 MG/L
MICROALBUMIN/CREAT UR: NORMAL MG/G{CREAT}
NONHDLC SERPL-MCNC: 66 MG/DL
POTASSIUM SERPL-SCNC: 4.5 MMOL/L (ref 3.4–5.3)
PSA SERPL-MCNC: 1.18 NG/ML (ref 0–6.5)
SODIUM SERPL-SCNC: 141 MMOL/L (ref 136–145)
TRIGL SERPL-MCNC: 63 MG/DL

## 2023-02-17 PROCEDURE — 82570 ASSAY OF URINE CREATININE: CPT

## 2023-02-17 PROCEDURE — 82043 UR ALBUMIN QUANTITATIVE: CPT

## 2023-02-17 PROCEDURE — 80061 LIPID PANEL: CPT

## 2023-02-17 PROCEDURE — G0103 PSA SCREENING: HCPCS

## 2023-02-17 PROCEDURE — 80048 BASIC METABOLIC PNL TOTAL CA: CPT

## 2023-02-17 PROCEDURE — 36415 COLL VENOUS BLD VENIPUNCTURE: CPT

## 2023-02-17 PROCEDURE — 83036 HEMOGLOBIN GLYCOSYLATED A1C: CPT

## 2023-02-23 PROBLEM — E11.9 TYPE 2 DIABETES MELLITUS WITHOUT COMPLICATION, WITHOUT LONG-TERM CURRENT USE OF INSULIN (H): Status: ACTIVE | Noted: 2023-02-23

## 2023-02-23 PROBLEM — F17.210 SMOKING 1/2 PACK A DAY OR LESS: Status: ACTIVE | Noted: 2023-02-23

## 2023-02-23 PROBLEM — E78.5 DYSLIPIDEMIA: Status: ACTIVE | Noted: 2023-02-23

## 2023-02-27 ENCOUNTER — OFFICE VISIT (OUTPATIENT)
Dept: FAMILY MEDICINE | Facility: CLINIC | Age: 73
End: 2023-02-27
Payer: COMMERCIAL

## 2023-02-27 VITALS
SYSTOLIC BLOOD PRESSURE: 140 MMHG | WEIGHT: 235.4 LBS | OXYGEN SATURATION: 95 % | BODY MASS INDEX: 33.7 KG/M2 | TEMPERATURE: 98 F | DIASTOLIC BLOOD PRESSURE: 72 MMHG | HEART RATE: 74 BPM | HEIGHT: 70 IN

## 2023-02-27 DIAGNOSIS — E11.9 TYPE 2 DIABETES MELLITUS WITH HEMOGLOBIN A1C GOAL OF LESS THAN 7.0% (H): ICD-10-CM

## 2023-02-27 DIAGNOSIS — I48.3 TYPICAL ATRIAL FLUTTER (H): ICD-10-CM

## 2023-02-27 DIAGNOSIS — Z11.59 NEED FOR HEPATITIS C SCREENING TEST: Primary | ICD-10-CM

## 2023-02-27 DIAGNOSIS — E78.5 DYSLIPIDEMIA: ICD-10-CM

## 2023-02-27 DIAGNOSIS — Z13.6 ENCOUNTER FOR ABDOMINAL AORTIC ANEURYSM (AAA) SCREENING: ICD-10-CM

## 2023-02-27 DIAGNOSIS — F17.210 SMOKING 1/2 PACK A DAY OR LESS: ICD-10-CM

## 2023-02-27 DIAGNOSIS — Z80.0 FAMILY HISTORY OF COLON CANCER IN MOTHER: ICD-10-CM

## 2023-02-27 DIAGNOSIS — E11.9 TYPE 2 DIABETES MELLITUS WITHOUT COMPLICATION, WITHOUT LONG-TERM CURRENT USE OF INSULIN (H): ICD-10-CM

## 2023-02-27 LAB — HCV AB SERPL QL IA: NONREACTIVE

## 2023-02-27 PROCEDURE — 86803 HEPATITIS C AB TEST: CPT | Performed by: INTERNAL MEDICINE

## 2023-02-27 PROCEDURE — 99214 OFFICE O/P EST MOD 30 MIN: CPT | Mod: 25 | Performed by: INTERNAL MEDICINE

## 2023-02-27 PROCEDURE — G0439 PPPS, SUBSEQ VISIT: HCPCS | Performed by: INTERNAL MEDICINE

## 2023-02-27 PROCEDURE — 36415 COLL VENOUS BLD VENIPUNCTURE: CPT | Performed by: INTERNAL MEDICINE

## 2023-02-27 PROCEDURE — G0008 ADMIN INFLUENZA VIRUS VAC: HCPCS | Performed by: INTERNAL MEDICINE

## 2023-02-27 PROCEDURE — 90662 IIV NO PRSV INCREASED AG IM: CPT | Performed by: INTERNAL MEDICINE

## 2023-02-27 PROCEDURE — 99207 PR FOOT EXAM NO CHARGE: CPT | Mod: 25 | Performed by: INTERNAL MEDICINE

## 2023-02-27 RX ORDER — SIMVASTATIN 40 MG
40 TABLET ORAL AT BEDTIME
Qty: 90 TABLET | Refills: 3 | Status: SHIPPED | OUTPATIENT
Start: 2023-02-27 | End: 2024-01-15

## 2023-02-27 ASSESSMENT — ENCOUNTER SYMPTOMS
NAUSEA: 0
COUGH: 0
PARESTHESIAS: 0
EYE PAIN: 0
HEMATOCHEZIA: 0
HEADACHES: 0
DIARRHEA: 0
MYALGIAS: 0
ARTHRALGIAS: 0
ABDOMINAL PAIN: 0
DIZZINESS: 0
WEAKNESS: 0
JOINT SWELLING: 0
CONSTIPATION: 0
CHILLS: 0
PALPITATIONS: 0
HEARTBURN: 0
DYSURIA: 0
HEMATURIA: 0
SHORTNESS OF BREATH: 0
NERVOUS/ANXIOUS: 0
SORE THROAT: 0
FREQUENCY: 0
FEVER: 0

## 2023-02-27 ASSESSMENT — ACTIVITIES OF DAILY LIVING (ADL): CURRENT_FUNCTION: NO ASSISTANCE NEEDED

## 2023-02-27 NOTE — LETTER
February 27, 2023      Iker Escobar  [address] [facility] [address]        Dear RyanLuz,    We are writing to inform you of your test results.    Normal.       Resulted Orders   Hepatitis C Screen Reflex to HCV RNA Quant and Genotype   Result Value Ref Range    Hepatitis C Antibody Nonreactive Nonreactive    Narrative    Assay performance characteristics have not been established for newborns, infants, and children.       If you have any questions or concerns, please call the clinic at the number listed above.       Sincerely,      Carlitos Espinosa MD

## 2023-02-27 NOTE — PROGRESS NOTES
"SUBJECTIVE:   Iker is a 72 year old who presents for Preventive Visit.    Patient has been advised of split billing requirements and indicates understanding: Yes  Are you in the first 12 months of your Medicare coverage?  No    Healthy Habits:     In general, how would you rate your overall health?  Fair    Frequency of exercise:  1 day/week    Duration of exercise:  Less than 15 minutes    Do you usually eat at least 4 servings of fruit and vegetables a day, include whole grains    & fiber and avoid regularly eating high fat or \"junk\" foods?  Yes    Taking medications regularly:  Yes    Medication side effects:  None    Ability to successfully perform activities of daily living:  No assistance needed    Home Safety:  Lack of grab bars in the bathroom    Hearing Impairment:  Difficulty following a conversation in a noisy restaurant or crowded room, feel that people are mumbling or not speaking clearly, need to ask people to speak up or repeat themselves, difficulty understanding soft or whispered speech and difficulty understanding speech on the telephone    In the past 6 months, have you been bothered by leaking of urine?  No    In general, how would you rate your overall mental or emotional health?  Good      PHQ-2 Total Score: 1    Additional concerns today:  No      Have you ever done Advance Care Planning? (For example, a Health Directive, POLST, or a discussion with a medical provider or your loved ones about your wishes): No, advance care planning information given to patient to review.  Patient plans to discuss their wishes with loved ones or provider.        Right Ear:      1000 Hz: RESPONSE- on Level:   Tone not heard   2000 Hz: RESPONSE- on Level:   Tone not heard   4000 Hz: RESPONSE- on Level:   Tone not heard    Left Ear:      4000 Hz: RESPONSE- on Level:   Tone not heard   2000 Hz: RESPONSE- on Level:   Tone not heard   1000 Hz: RESPONSE- on Level:   20 db     500 Hz: RESPONSE- on Level: tone not " "heard    Right Ear:    500 Hz: RESPONSE- on Level: tone not heard    Hearing Acuity: REFER    Hearing Assessment: abnormal--\"I know I am deaf.\"  Declines audiology referral has done it in the past.  Not interested in hearing aids at this time.     Fall risk  Fallen 2 or more times in the past year?: No  Any fall with injury in the past year?: No    Cognitive Screening   1) Repeat 3 items (Leader, Season, Table)    2) Clock draw: NORMAL  3) 3 item recall: Recalls 3 objects  Results: 3 items recalled: COGNITIVE IMPAIRMENT LESS LIKELY    Mini-CogTM Copyright S Jason. Licensed by the author for use in [facility]; reprinted with permission (soob@[email or web address]). All rights reserved.      Do you have sleep apnea, excessive snoring or daytime drowsiness?: Denies snoring or daytime sleepiness.  We discussed this in the context of his atrial fibrillation.    Reviewed and updated as needed this visit by clinical staff   Tobacco  Allergies  Meds              Reviewed and updated as needed this visit by Provider                 Social History     Tobacco Use     Smoking status: Every Day     Packs/day: 0.25     Years: 54.00     Pack years: 13.50     Types: Cigarettes     Smokeless tobacco: Never   Substance Use Topics     Alcohol use: Yes     Alcohol/week: 3.0 standard drinks     Types: 3 Standard drinks or equivalent per week     Alcohol Use 2/27/2023   Prescreen: >3 drinks/day or >7 drinks/week? No     Current providers sharing in care for this patient include:   Patient Care Team:  Carlitos Espinosa MD as PCP - General (Internal Medicine)  Matty Najera OD as PCP - Ophthalmology (Ophthalmology)  Carlitos Espinosa MD as Assigned PCP    The following health maintenance items are reviewed in Epic and correct as of today:  Health Maintenance   Topic Date Due     ADVANCE CARE PLANNING  Never done     HEPATITIS C SCREENING  Never done     DTAP/TDAP/TD IMMUNIZATION (1 - Tdap) Never done     LUNG CANCER SCREENING  Never " done     AORTIC ANEURYSM SCREENING (SYSTEM ASSIGNED)  Never done     ZOSTER IMMUNIZATION (2 of 2) 12/28/2020     Pneumococcal Vaccine: 65+ Years (2 - PCV) 03/06/2021     INFLUENZA VACCINE (1) 09/01/2022     EYE EXAM  05/02/2023     A1C  05/17/2023     COLORECTAL CANCER SCREENING  05/23/2023     BMP  02/17/2024     LIPID  02/17/2024     MICROALBUMIN  02/17/2024     NICOTINE/TOBACCO CESSATION COUNSELING Q 1 YR  02/27/2024     MEDICARE ANNUAL WELLNESS VISIT  02/27/2024     DIABETIC FOOT EXAM  02/27/2024     ANNUAL REVIEW OF HM ORDERS  02/27/2024     FALL RISK ASSESSMENT  02/27/2024     PHQ-2 (once per calendar year)  Completed     COVID-19 Vaccine  Completed     IPV IMMUNIZATION  Aged Out     MENINGITIS IMMUNIZATION  Aged Out     Lab work is in process  Labs reviewed in EPIC          Review of Systems   Constitutional: Negative for chills and fever.   HENT: Positive for congestion and hearing loss. Negative for ear pain and sore throat.    Eyes: Negative for pain and visual disturbance.   Respiratory: Negative for cough and shortness of breath.    Cardiovascular: Negative for chest pain, palpitations and peripheral edema.   Gastrointestinal: Negative for abdominal pain, constipation, diarrhea, heartburn, hematochezia and nausea.   Genitourinary: Positive for impotence. Negative for dysuria, frequency, genital sores, hematuria, penile discharge and urgency.   Musculoskeletal: Negative for arthralgias, joint swelling and myalgias.   Skin: Negative for rash.   Neurological: Negative for dizziness, weakness, headaches and paresthesias.   Psychiatric/Behavioral: Negative for mood changes. The patient is not nervous/anxious.      Patient with history of nutritionally controlled type 2 diabetes and atrial fibrillation presents for follow-up.  Has had no cardiovascular symptoms.  No recurrent atrial fibrillation or palpitations.  Chronic poor hearing is not interested in further evaluation.  Erectile dysfunction but again is  "not interested in medication.    OBJECTIVE:   BP (!) 140/72   Pulse 74   Temp 98  F (36.7  C)   Ht 1.765 m (5' 9.5\")   Wt 106.8 kg (235 lb 6.4 oz)   SpO2 95%   BMI 34.26 kg/m   Estimated body mass index is 34.26 kg/m  as calculated from the following:    Height as of this encounter: 1.765 m (5' 9.5\").    Weight as of this encounter: 106.8 kg (235 lb 6.4 oz).  Physical Exam  Obese man in no distress  Eyes appear normal no scleral icterus  HEENT exam unremarkable.  Mallampati 1  Neck supple adenopathy or thyromegaly  Lungs clear  Cardiac exam regular no murmur or edema.  Normal carotid pulsations  Abdomen obese, soft, nontender.  Repaired umbilical hernia  Rectal exam declined  No hot joints  No concerning skin lesions observed  Patient is alert and oriented with fluent speech and intact memory.  Cranial nerves normal.  Strength normal.  Mood and affect normal  Feet are in good condition with intact dorsalis pedis and posterior tibial pulsations.  Sensation monofilament and vibration intact bilaterally.    Recent Results (from the past 720 hour(s))   Basic metabolic panel  (Ca, Cl, CO2, Creat, Gluc, K, Na, BUN)    Collection Time: 02/17/23  7:17 AM   Result Value Ref Range    Sodium 141 136 - 145 mmol/L    Potassium 4.5 3.4 - 5.3 mmol/L    Chloride 105 98 - 107 mmol/L    Carbon Dioxide (CO2) 27 22 - 29 mmol/L    Anion Gap 9 7 - 15 mmol/L    Urea Nitrogen 13.6 8.0 - 23.0 mg/dL    Creatinine 0.92 0.67 - 1.17 mg/dL    Calcium 9.3 8.8 - 10.2 mg/dL    Glucose 126 (H) 70 - 99 mg/dL    GFR Estimate 89 >60 mL/min/1.73m2   Hemoglobin A1c    Collection Time: 02/17/23  7:17 AM   Result Value Ref Range    Hemoglobin A1C 6.8 (H) 0.0 - 5.6 %   Lipid panel reflex to direct LDL Fasting    Collection Time: 02/17/23  7:17 AM   Result Value Ref Range    Cholesterol 103 <200 mg/dL    Triglycerides 63 <150 mg/dL    Direct Measure HDL 37 (L) >=40 mg/dL    LDL Cholesterol Calculated 53 <=100 mg/dL    Non HDL Cholesterol 66 <130 mg/dL "   PSA, screen    Collection Time: 02/17/23  7:17 AM   Result Value Ref Range    Prostate Specific Antigen Screen 1.18 0.00 - 6.50 ng/mL   Albumin Random Urine Quantitative with Creat Ratio    Collection Time: 02/17/23  7:17 AM   Result Value Ref Range    Creatinine Urine mg/dL 98.6 mg/dL    Albumin Urine mg/L <12.0 mg/L    Albumin Urine mg/g Cr           ASSESSMENT / PLAN:       ICD-10-CM    1. Need for hepatitis C screening test  Z11.59 Hepatitis C Screen Reflex to HCV RNA Quant and Genotype      2. Smoking 1/2 pack a day or less  F17.210       3. Type 2 diabetes mellitus without complication, without long-term current use of insulin (H)  E11.9 FOOT EXAM      4. Dyslipidemia  E78.5       5. Encounter for abdominal aortic aneurysm (AAA) screening  Z13.6 US Aorta Medicare AAA Screening      6. Family history of colon cancer in mother  Z80.0 Colonoscopy Screening  Referral      7. Type 2 diabetes mellitus with hemoglobin A1c goal of less than 7.0% (H)  E11.9 simvastatin (ZOCOR) 40 MG tablet      8. Typical atrial flutter (H)  I48.3 rivaroxaban ANTICOAGULANT (XARELTO ANTICOAGULANT) 20 MG TABS tablet      Obese man with type 2 diabetes which is uncomplicated.  Discussed the need for weight loss and regular physical activity.  Blood pressure elevated today.  Patient will get some systematic home readings get the results to us.  Anticoagulated for atrial flutter with recurrence of flutter or bleeding complications          COUNSELING:  Reviewed preventive health counseling, as reflected in patient instructions  Special attention given to:       Consider AAA screening for ages 65-75 and smoking history       Regular exercise       Healthy diet/nutrition       Immunizations    Vaccinated for: Influenza and Declined: Pneumococcal and TDAP due to Other patient does not like to have more than one immunization at a time.               Hepatitis C screening       Consider lung cancer screening for ages 55-80 years (77  for Medicare) and 20 pack-year smoking history patient is not a candidate for lung cancer screening due to having smoked less than 20 pack years       Colon cancer screening       Prostate cancer screening        He reports that he has been smoking cigarettes. He has a 13.50 pack-year smoking history. He has never used smokeless tobacco.  Nicotine/Tobacco Cessation Plan:   Information offered: Patient not interested at this time      Appropriate preventive services were discussed with this patient, including applicable screening as appropriate for cardiovascular disease, diabetes, osteopenia/osteoporosis, and glaucoma.  As appropriate for age/gender, discussed screening for colorectal cancer, prostate cancer, breast cancer, and cervical cancer. Checklist reviewing preventive services available has been given to the patient.    Reviewed patients plan of care and provided an AVS. The Intermediate Care Plan ( asthma action plan, low back pain action plan, and migraine action plan) for Iker meets the Care Plan requirement. This Care Plan has been established and reviewed with the Patient.          Carlitos Espinosa MD  [facility]    Identified Health Risks:

## 2023-03-13 ENCOUNTER — HOSPITAL ENCOUNTER (OUTPATIENT)
Dept: ULTRASOUND IMAGING | Facility: HOSPITAL | Age: 73
Discharge: HOME | End: 2023-03-13
Attending: INTERNAL MEDICINE | Admitting: INTERNAL MEDICINE
Payer: MEDICARE

## 2023-03-13 DIAGNOSIS — Z13.6 ENCOUNTER FOR ABDOMINAL AORTIC ANEURYSM (AAA) SCREENING: ICD-10-CM

## 2023-03-13 PROCEDURE — 76706 US ABDL AORTA SCREEN AAA: CPT

## 2023-04-10 ENCOUNTER — OFFICE VISIT (OUTPATIENT)
Dept: FAMILY MEDICINE | Facility: CLINIC | Age: 73
End: 2023-04-10
Payer: COMMERCIAL

## 2023-04-10 VITALS
HEIGHT: 70 IN | DIASTOLIC BLOOD PRESSURE: 68 MMHG | TEMPERATURE: 97.4 F | RESPIRATION RATE: 17 BRPM | HEART RATE: 51 BPM | SYSTOLIC BLOOD PRESSURE: 136 MMHG | BODY MASS INDEX: 33.46 KG/M2 | WEIGHT: 233.7 LBS | OXYGEN SATURATION: 96 %

## 2023-04-10 DIAGNOSIS — I48.92 ATRIAL FLUTTER, UNSPECIFIED TYPE (H): ICD-10-CM

## 2023-04-10 DIAGNOSIS — Z01.818 PREOPERATIVE EXAMINATION: Primary | ICD-10-CM

## 2023-04-10 DIAGNOSIS — E78.5 DYSLIPIDEMIA: ICD-10-CM

## 2023-04-10 DIAGNOSIS — E66.811 CLASS 1 OBESITY DUE TO EXCESS CALORIES WITH SERIOUS COMORBIDITY AND BODY MASS INDEX (BMI) OF 34.0 TO 34.9 IN ADULT: ICD-10-CM

## 2023-04-10 DIAGNOSIS — E66.09 CLASS 1 OBESITY DUE TO EXCESS CALORIES WITH SERIOUS COMORBIDITY AND BODY MASS INDEX (BMI) OF 34.0 TO 34.9 IN ADULT: ICD-10-CM

## 2023-04-10 DIAGNOSIS — E11.9 TYPE 2 DIABETES MELLITUS WITHOUT COMPLICATION, WITHOUT LONG-TERM CURRENT USE OF INSULIN (H): ICD-10-CM

## 2023-04-10 PROCEDURE — 99214 OFFICE O/P EST MOD 30 MIN: CPT | Performed by: INTERNAL MEDICINE

## 2023-04-10 ASSESSMENT — ENCOUNTER SYMPTOMS
NAUSEA: 0
PALPITATIONS: 0
FEVER: 0
ABDOMINAL DISTENTION: 0
FATIGUE: 0
BRUISES/BLEEDS EASILY: 0
DIFFICULTY URINATING: 0
UNEXPECTED WEIGHT CHANGE: 0
COUGH: 0
SLEEP DISTURBANCE: 0
MYALGIAS: 0
SHORTNESS OF BREATH: 0
DIARRHEA: 0
HEADACHES: 0
CONSTIPATION: 0
VOMITING: 0

## 2023-04-10 NOTE — PROGRESS NOTES
[address] [address]  Phone: [phone]  Fax: [phone]  Primary Provider: Nagi Villanueva  Pre-op Performing Provider: NAGI VILLANUEVA      PREOPERATIVE EVALUATION:  Today's date: 4/10/2023    Iker Escobar is a 72 year old male who presents for a preoperative evaluation.      4/10/2023     9:43 AM   Additional Questions   Roomed by Lisa HASSAN CMA   Accompanied by self     Surgical Information:  Surgery/Procedure: Colonoscopy   Surgery Location: [facility]   Surgeon: Dr. Holley  Surgery Date: 4/28/23  Time of Surgery: TBD  Where patient plans to recover: At home with family  Fax number for surgical facility: will need to obtain     Assessment & Plan     The proposed surgical procedure is considered LOW risk.    Problem List Items Addressed This Visit        Digestive    Class 1 obesity due to excess calories with serious comorbidity and body mass index (BMI) of 34.0 to 34.9 in adult       Endocrine    Type 2 diabetes mellitus without complication, without long-term current use of insulin (H)     Uncomplicated.  Not requiring medical therapy   diabetes is improving/stable/worsening: Controlled.  Dietary recommendations for ADA diet. and Regular aerobic exercise.  Diabetes will be reassessed in 6 months.         Dyslipidemia     Simvastatin 40 mg daily            Circulatory    Atrial flutter (H)     Rivaroxaban 20 mg daily  Stop rivaroxaban after 4/24/2023 dose for colonoscopy 4/28/2023        Other Visit Diagnoses     Preoperative examination    -  Primary        Stable for surgery        Medication Instructions:  Last dose of rivaroxaban for 4/24/2023 for surgery 4/28/2023 unless advised otherwise by the surgery center  RECOMMENDATION:  APPROVAL GIVEN to proceed with proposed procedure, without further diagnostic evaluation.            Subjective     HPI related to upcoming procedure: Patient scheduled for screening screening colonoscopy.   Has a history of atrial flutter and is anticoagulated.  Has not had symptoms for many years.  No history of coronary disease or congestive heart failure.  No history of sleep apnea or snoring.  No history of problems with anesthesia or surgical bleeding.        4/10/2023     9:40 AM   Preop Questions   1. Have you ever had a heart attack or stroke? No   2. Have you ever had surgery on your heart or blood vessels, such as a stent placement, a coronary artery bypass, or surgery on an artery in your head, neck, heart, or legs? No   3. Do you have chest pain with activity? No   4. Do you have a history of  heart failure? No   5. Do you currently have a cold, bronchitis or symptoms of other infection? No   6. Do you have a cough, shortness of breath, or wheezing? No   7. Do you or anyone in your family have previous history of blood clots? No   8. Do you or does anyone in your family have a serious bleeding problem such as prolonged bleeding following surgeries or cuts? No   9. Have you ever had problems with anemia or been told to take iron pills? No   10. Have you had any abnormal blood loss such as black, tarry or bloody stools? No   11. Have you ever had a blood transfusion? No   12. Are you willing to have a blood transfusion if it is medically needed before, during, or after your surgery? Yes   13. Have you or any of your relatives ever had problems with anesthesia? No   14. Do you have sleep apnea, excessive snoring or daytime drowsiness? No   15. Do you have any artifical heart valves or other implanted medical devices like a pacemaker, defibrillator, or continuous glucose monitor? No   16. Do you have artificial joints? No   17. Are you allergic to latex? YES:        Health Care Directive:  Patient does not have a Health Care Directive or Living Will: Patient states has Advance Directive and will bring in a copy to clinic.    Preoperative Review of :   reviewed - no record of controlled substances  prescribed.          Review of Systems   Constitutional: Negative for fatigue, fever and unexpected weight change.   HENT: Positive for hearing loss.    Respiratory: Negative for cough and shortness of breath.    Cardiovascular: Negative for chest pain, palpitations and peripheral edema.   Gastrointestinal: Negative for abdominal distention, constipation, diarrhea, nausea and vomiting.   Endocrine: Negative for polyuria.   Genitourinary: Negative for difficulty urinating.   Musculoskeletal: Negative for myalgias.   Neurological: Negative for headaches.   Hematological: Does not bruise/bleed easily.   Psychiatric/Behavioral: Negative for sleep disturbance.         Patient Active Problem List    Diagnosis Date Noted     Smoking 1/2 pack a day or less 02/23/2023     Priority: Medium     Type 2 diabetes mellitus without complication, without long-term current use of insulin (H) 02/23/2023     Priority: Medium     Dyslipidemia 02/23/2023     Priority: Medium     Atrial flutter (H) 07/14/2022     Priority: Medium     Obesity (BMI 35.0-39.9 without comorbidity) 05/09/2018     Priority: Medium      No past medical history on file.  Past Surgical History:   Procedure Laterality Date     CATARACT EXTRACTION Bilateral 2018     CHOLECYSTECTOMY       COLONOSCOPY W/ POLYPECTOMY  05/23/2018    divertiulosis, 1 hyperplastic polyp     Current Outpatient Medications   Medication Sig Dispense Refill     blood glucose (NO BRAND SPECIFIED) test strip Use to test blood sugar 7x week or as directed. 100 strip 1     blood glucose monitoring (ACCU-CHEK MULTICLIX) lancets Use to test blood sugar 7x week or as directed. 102 each 1     rivaroxaban ANTICOAGULANT (XARELTO ANTICOAGULANT) 20 MG TABS tablet Take 1 tablet (20 mg) by mouth every evening for 90 days 90 tablet 3     simvastatin (ZOCOR) 40 MG tablet Take 1 tablet (40 mg) by mouth At Bedtime for 90 days 90 tablet 3       No Known Allergies     Social History     Tobacco Use     Smoking  "status: Every Day     Packs/day: 0.25     Years: 54.00     Pack years: 13.50     Types: Cigarettes     Smokeless tobacco: Never   Vaping Use     Vaping status: Never Used   Substance Use Topics     Alcohol use: Yes     Alcohol/week: 3.0 standard drinks of alcohol     Types: 3 Standard drinks or equivalent per week     Family History   Problem Relation Age of Onset     Coronary Artery Disease Mother      Colon Cancer Mother 58     Parkinsonism Mother      Prostate Cancer Brother      Atrial fibrillation Brother      Colon Cancer Maternal Aunt         age uncertain     Coronary Artery Disease Nephew      Atrial fibrillation Nephew      Lung Cancer No family hx of      History   Drug Use Not on file         Objective     /68 (BP Location: Right arm)   Pulse 51   Temp 97.4  F (36.3  C)   Resp 17   Ht 1.765 m (5' 9.5\")   Wt 106 kg (233 lb 11.2 oz)   SpO2 96%   BMI 34.02 kg/m      Physical Exam  Healthy-appearing man in no distress.  Obese.  HEENT exam unremarkable.  Mallampati 2.  Neck without adenopathy or thyromegaly  Lungs clear  Cardiac exam regular no murmur or edema.  Carotid and posterior tibial pulsations normal  Abdomen soft and nontender  Patient is alert and oriented with fluent speech, good memory, no facial asymmetry, EOMI, normal strength and gait  Normal mood and affect    Recent Labs   Lab Test 02/17/23  0717 03/31/22  0857    139   POTASSIUM 4.5 4.2   CR 0.92 0.84   A1C 6.8* 6.6*        Diagnostics:  No labs were ordered during this visit.   No EKG required for low risk surgery (cataract, skin procedure, breast biopsy, etc).    Revised Cardiac Risk Index (RCRI):  The patient has the following serious cardiovascular risks for perioperative complications:   - No serious cardiac risks = 0 points     RCRI Interpretation: 0 points: Class I (very low risk - 0.4% complication rate)           Signed Electronically by: Carlitos Espinosa MD  Copy of this evaluation report is provided to requesting " physician.

## 2023-04-10 NOTE — ASSESSMENT & PLAN NOTE
Uncomplicated.  Not requiring medical therapy   diabetes is improving/stable/worsening: Controlled.  Dietary recommendations for ADA diet. and Regular aerobic exercise.  Diabetes will be reassessed in 6 months.

## 2023-05-20 ENCOUNTER — HEALTH MAINTENANCE LETTER (OUTPATIENT)
Age: 73
End: 2023-05-20

## 2023-10-22 ENCOUNTER — HEALTH MAINTENANCE LETTER (OUTPATIENT)
Age: 73
End: 2023-10-22

## 2024-01-15 DIAGNOSIS — I48.3 TYPICAL ATRIAL FLUTTER (H): ICD-10-CM

## 2024-01-15 DIAGNOSIS — E11.9 TYPE 2 DIABETES MELLITUS WITH HEMOGLOBIN A1C GOAL OF LESS THAN 7.0% (H): ICD-10-CM

## 2024-01-16 RX ORDER — SIMVASTATIN 40 MG
40 TABLET ORAL AT BEDTIME
Qty: 90 TABLET | Refills: 3 | Status: SHIPPED | OUTPATIENT
Start: 2024-01-16 | End: 2024-02-29

## 2024-02-29 ENCOUNTER — OFFICE VISIT (OUTPATIENT)
Dept: FAMILY MEDICINE | Facility: CLINIC | Age: 74
End: 2024-02-29
Payer: COMMERCIAL

## 2024-02-29 ENCOUNTER — ORDERS ONLY (AUTO-RELEASED) (OUTPATIENT)
Dept: FAMILY MEDICINE | Facility: CLINIC | Age: 74
End: 2024-02-29

## 2024-02-29 VITALS
TEMPERATURE: 96.7 F | HEIGHT: 69 IN | SYSTOLIC BLOOD PRESSURE: 124 MMHG | WEIGHT: 231 LBS | DIASTOLIC BLOOD PRESSURE: 74 MMHG | HEART RATE: 56 BPM | BODY MASS INDEX: 34.21 KG/M2 | RESPIRATION RATE: 20 BRPM

## 2024-02-29 DIAGNOSIS — F17.210 SMOKING 1/2 PACK A DAY OR LESS: ICD-10-CM

## 2024-02-29 DIAGNOSIS — E66.09 CLASS 1 OBESITY DUE TO EXCESS CALORIES WITH SERIOUS COMORBIDITY AND BODY MASS INDEX (BMI) OF 34.0 TO 34.9 IN ADULT: ICD-10-CM

## 2024-02-29 DIAGNOSIS — E66.811 CLASS 1 OBESITY DUE TO EXCESS CALORIES WITH SERIOUS COMORBIDITY AND BODY MASS INDEX (BMI) OF 34.0 TO 34.9 IN ADULT: ICD-10-CM

## 2024-02-29 DIAGNOSIS — E78.5 DYSLIPIDEMIA: ICD-10-CM

## 2024-02-29 DIAGNOSIS — H90.3 BILATERAL SENSORINEURAL HEARING LOSS: ICD-10-CM

## 2024-02-29 DIAGNOSIS — H02.9 LESION OF RIGHT LOWER EYELID: ICD-10-CM

## 2024-02-29 DIAGNOSIS — I48.3 TYPICAL ATRIAL FLUTTER (H): ICD-10-CM

## 2024-02-29 DIAGNOSIS — Z00.00 ENCOUNTER FOR MEDICARE ANNUAL WELLNESS EXAM: Primary | ICD-10-CM

## 2024-02-29 DIAGNOSIS — E11.9 TYPE 2 DIABETES MELLITUS WITHOUT COMPLICATION, WITHOUT LONG-TERM CURRENT USE OF INSULIN (H): ICD-10-CM

## 2024-02-29 PROBLEM — K63.5 POLYP OF COLON: Status: ACTIVE | Noted: 2024-02-29

## 2024-02-29 LAB
ANION GAP SERPL CALCULATED.3IONS-SCNC: 11 MMOL/L (ref 7–15)
BUN SERPL-MCNC: 13.7 MG/DL (ref 8–23)
CALCIUM SERPL-MCNC: 9.3 MG/DL (ref 8.8–10.2)
CHLORIDE SERPL-SCNC: 104 MMOL/L (ref 98–107)
CHOLEST SERPL-MCNC: 118 MG/DL
CREAT SERPL-MCNC: 0.85 MG/DL (ref 0.67–1.17)
CREAT UR-MCNC: 124 MG/DL
DEPRECATED HCO3 PLAS-SCNC: 25 MMOL/L (ref 22–29)
EGFRCR SERPLBLD CKD-EPI 2021: >90 ML/MIN/1.73M2
ERYTHROCYTE [DISTWIDTH] IN BLOOD BY AUTOMATED COUNT: 13.1 % (ref 10–15)
FASTING STATUS PATIENT QL REPORTED: YES
GLUCOSE SERPL-MCNC: 129 MG/DL (ref 70–99)
HBA1C MFR BLD: 7.4 % (ref 0–5.6)
HCT VFR BLD AUTO: 44.4 % (ref 40–53)
HDLC SERPL-MCNC: 38 MG/DL
HGB BLD-MCNC: 14.6 G/DL (ref 13.3–17.7)
LDLC SERPL CALC-MCNC: 60 MG/DL
MCH RBC QN AUTO: 29.1 PG (ref 26.5–33)
MCHC RBC AUTO-ENTMCNC: 32.9 G/DL (ref 31.5–36.5)
MCV RBC AUTO: 89 FL (ref 78–100)
MICROALBUMIN UR-MCNC: <12 MG/L
MICROALBUMIN/CREAT UR: NORMAL MG/G{CREAT}
NONHDLC SERPL-MCNC: 80 MG/DL
PLATELET # BLD AUTO: 243 10E3/UL (ref 150–450)
POTASSIUM SERPL-SCNC: 4.4 MMOL/L (ref 3.4–5.3)
RBC # BLD AUTO: 5.01 10E6/UL (ref 4.4–5.9)
SODIUM SERPL-SCNC: 140 MMOL/L (ref 135–145)
TRIGL SERPL-MCNC: 99 MG/DL
TSH SERPL DL<=0.005 MIU/L-ACNC: 4.01 UIU/ML (ref 0.3–4.2)
WBC # BLD AUTO: 6.2 10E3/UL (ref 4–11)

## 2024-02-29 PROCEDURE — 80048 BASIC METABOLIC PNL TOTAL CA: CPT | Performed by: INTERNAL MEDICINE

## 2024-02-29 PROCEDURE — 85027 COMPLETE CBC AUTOMATED: CPT | Performed by: INTERNAL MEDICINE

## 2024-02-29 PROCEDURE — 36415 COLL VENOUS BLD VENIPUNCTURE: CPT | Performed by: INTERNAL MEDICINE

## 2024-02-29 PROCEDURE — 82570 ASSAY OF URINE CREATININE: CPT | Performed by: INTERNAL MEDICINE

## 2024-02-29 PROCEDURE — 82043 UR ALBUMIN QUANTITATIVE: CPT | Performed by: INTERNAL MEDICINE

## 2024-02-29 PROCEDURE — 80061 LIPID PANEL: CPT | Performed by: INTERNAL MEDICINE

## 2024-02-29 PROCEDURE — 99214 OFFICE O/P EST MOD 30 MIN: CPT | Mod: 25 | Performed by: INTERNAL MEDICINE

## 2024-02-29 PROCEDURE — 83036 HEMOGLOBIN GLYCOSYLATED A1C: CPT | Performed by: INTERNAL MEDICINE

## 2024-02-29 PROCEDURE — 90677 PCV20 VACCINE IM: CPT | Performed by: INTERNAL MEDICINE

## 2024-02-29 PROCEDURE — G0009 ADMIN PNEUMOCOCCAL VACCINE: HCPCS | Performed by: INTERNAL MEDICINE

## 2024-02-29 PROCEDURE — 93000 ELECTROCARDIOGRAM COMPLETE: CPT | Performed by: INTERNAL MEDICINE

## 2024-02-29 PROCEDURE — G0439 PPPS, SUBSEQ VISIT: HCPCS | Performed by: INTERNAL MEDICINE

## 2024-02-29 PROCEDURE — 84443 ASSAY THYROID STIM HORMONE: CPT | Performed by: INTERNAL MEDICINE

## 2024-02-29 RX ORDER — SIMVASTATIN 40 MG
40 TABLET ORAL AT BEDTIME
Qty: 90 TABLET | Refills: 3 | Status: SHIPPED | OUTPATIENT
Start: 2024-02-29 | End: 2024-03-12

## 2024-02-29 NOTE — PROGRESS NOTES
Preventive Care Visit  [facility]  Carlitos Espinosa MD, Internal Medicine  Feb 29, 2024    Assessment & Plan   Problem List Items Addressed This Visit       Atrial flutter (H)     Episode with symptoms and controlled rate 4/9/21  [id number]  No symptoms  Rivaroxaban 20 mg daily  EXAM DATE: 05/13/2010    PATIENT NAME: JAMES MCCRACKEN    EXAM: NM NM MYOCARDIAL ECT MULTIPLE STDYS / NM NM MYOCARDIAL PERF W/EJEC FRAC / NM NM OTHER    Patient History:  CHEST PAIN    Study Information:  MYOCARDIAL PERFUSION SCAN WITH GATED SPECT    INDICATION: Chest pain.  TECHNIQUE: 12.0 mCi of Tc-99m Myoview was injected at rest. Imaging of the heart was obtained with SPECT. 25.0 mCi of Tc-99m Myoview was then injected during the performance of an exercise stress test. The patient reached a maximum heart rate of 146 ( 89 % M.P.H.R.). Imaging of the heart repeated with gated SPECT.    FINDINGS: The left ventricular myocardial perfusion appears to be within normal limits. Decreased activity appears to be associated with the inferior wall on the post stress images. No significant change is appreciated on the rest images. No regional wall motion abnormalities identified on the gated SPECT images. The inferior wall appears to move and thicken within normal limits. The left ventricular ejection fraction is calculated to be 60%.    CONCLUSION:    1. Negative study. No myocardial ischemia identified.    2. Diaphragmatic attenuation artifact inferior wall.          Relevant Medications    rivaroxaban ANTICOAGULANT (XARELTO ANTICOAGULANT) 20 MG TABS tablet    Other Relevant Orders    TSH    CBC with platelets (Completed)    EKG 12-lead complete w/read - Clinics (Completed)    Echocardiogram Complete    ZIO PATCH MAIL OUT    Class 1 obesity due to excess calories with serious comorbidity and body mass index (BMI) of 34.0 to 34.9 in adult    Smoking 1/2 pack a day or less     Smokes 4-5 cigarettes per day  Not interested  "in stopping  Declines lung cancer screening         Type 2 diabetes mellitus without complication, without long-term current use of insulin (H)     Diabetes is improving/stable/worsening: unchanged.  Continue current treatment regimen., Discussed foot care., Reminded to get yearly retinal exam., Medication changes per orders., and Diabetes educator referral.  Diabetes will be reassessed in 6 months.         Relevant Orders    HEMOGLOBIN A1C    BASIC METABOLIC PANEL    Albumin Random Urine Quantitative with Creat Ratio    TSH    Dyslipidemia     Simvastatin 40 mg daily         Relevant Medications    simvastatin (ZOCOR) 40 MG tablet    Other Relevant Orders    Lipid panel reflex to direct LDL Non-fasting     Other Visit Diagnoses       Encounter for Medicare annual wellness exam    -  Primary    Lesion of right lower eyelid        Present for 5-6 months    Relevant Orders    Adult Eye  Referral    Bilateral sensorineural hearing loss        Relevant Orders    Adult Audiology  Referral                      Nicotine/Tobacco Cessation  He reports that he has been smoking cigarettes. He has a 13.5 pack-year smoking history. He has never used smokeless tobacco.  Nicotine/Tobacco Cessation Plan  Information offered: Patient not interested at this time      BMI  Estimated body mass index is 34.36 kg/m  as calculated from the following:    Height as of this encounter: 1.746 m (5' 8.75\").    Weight as of this encounter: 104.8 kg (231 lb).   Weight management plan: Discussed healthy diet and exercise guidelines    Counseling  Appropriate preventive services were discussed with this patient, including applicable screening as appropriate for fall prevention, nutrition, physical activity, Tobacco-use cessation, weight loss and cognition.  Checklist reviewing preventive services available has been given to the patient.  Reviewed patient's diet, addressing concerns and/or questions.   He is at risk for lack of " exercise and has been provided with information to increase physical activity for the benefit of his well-being.   The patient was instructed to see the dentist every 6 months.   He is at risk for psychosocial distress and has been provided with information to reduce risk.   Patient reported safety concerns were addressed today.The patient was provided with written information regarding signs of hearing loss.     Follow-up in 6 months      Subjective   Feroz is a 73 year old, presenting for the following:  Wellness Visit (Pt here for an AWV and fasting labwork)        2/29/2024     9:18 AM   Additional Questions   Roomed by Matty [facility]         Health Care Directive  Patient does not have a Health Care Directive or Living Will: Patient states has Advance Directive and will bring in a copy to clinic.      HEARING FREQUENCY    Right Ear:      1000 Hz RESPONSE- on Level: 40 db (Conditioning sound)   1000 Hz: RESPONSE- on Level: tone not heard   2000 Hz: RESPONSE- on Level: tone not heard   4000 Hz: RESPONSE- on Level: tone not heard    Left Ear:      4000 Hz: RESPONSE- on Level: tone not heard   2000 Hz: RESPONSE- on Level: tone not heard   1000 Hz: RESPONSE- on Level: 40 db       Hearing Acuity: REFER    Hearing Assessment: abnormal--refer to audiology           HPI  Patient here for follow-up.  Generally feeling well.  Not aware of any palpitations.  No chest pain or dyspnea.  No bleeding complications from Xarelto.  No hypoglycemia.  He denies snoring or daytime sleepiness.  Continues to smoke a small amount but declines screening for lung cancer or assistance in quitting.  Does complain of a lesion that is been present on the right lower lid for approximately 6 months.  Does not interfere with vision arthritis pain.  Has not seen the eye doctor in over a year.  Chronic hearing loss with a history of recurrent infections.    Diabetes Follow-up    How often are you checking your blood sugar? Not at all  What concerns do  you have today about your diabetes? None   Do you have any of these symptoms? (Select all that apply)  No numbness or tingling in feet.  No redness, sores or blisters on feet.  No complaints of excessive thirst.  No reports of blurry vision.  No significant changes to weight.  Have you had a diabetic eye exam in the last 12 months? No        BP Readings from Last 2 Encounters:   02/29/24 124/74   04/10/23 136/68     Hemoglobin A1C (%)   Date Value   02/29/2024 7.4 (H)   02/17/2023 6.8 (H)     LDL Cholesterol Calculated (mg/dL)   Date Value   02/17/2023 53   03/31/2022 52               2/29/2024   General Health   How would you rate your overall physical health? Good   Feel stress (tense, anxious, or unable to sleep) Only a little   (!) STRESS CONCERN      2/29/2024   Nutrition   Diet: Diabetic         2/29/2024   Exercise   Days per week of moderate/strenous exercise 3 days         2/29/2024   Social Factors   Frequency of gathering with friends or relatives Once a week   Worry food won't last until get money to buy more No   Food not last or not have enough money for food? No   Do you have housing?  Yes   Are you worried about losing your housing? No   Lack of transportation? No   Unable to get utilities (heat,electricity)? No         2/29/2024   Fall Risk   Fallen 2 or more times in the past year? No    No   Trouble with walking or balance? No    No          2/29/2024   Activities of Daily Living- Home Safety   Needs help with the following daily activites None of the above   Safety concerns in the home Throw rugs in the hallway    No grab bars in the bathroom         2/29/2024   Dental   Dentist two times every year? (!) NO         2/29/2024   Hearing Screening   Hearing concerns? (!) I FEEL THAT PEOPLE ARE MUMBLING OR NOT SPEAKING CLEARLY.    (!) I NEED TO ASK PEOPLE TO SPEAK UP OR REPEAT THEMSELVES.    (!) IT'S HARD TO FOLLOW A CONVERSATION IN A NOISY RESTAURANT OR CROWDED ROOM.    (!) TROUBLE UNDERSTANDING  SOFT OR WHISPERED SPEECH.         2/29/2024   Driving Risk Screening   Patient/family members have concerns about driving No         2/29/2024   General Alertness/Fatigue Screening   Have you been more tired than usual lately? No         2/29/2024   Urinary Incontinence Screening   Bothered by leaking urine in past 6 months No            Today's PHQ-2 Score:       2/29/2024     9:01 AM   PHQ-2 ( 1999 [employer])   Q1: Little interest or pleasure in doing things 0   Q2: Feeling down, depressed or hopeless 0   PHQ-2 Score 0   Q1: Little interest or pleasure in doing things Not at all   Q2: Feeling down, depressed or hopeless Not at all   PHQ-2 Score 0           2/29/2024   Substance Use   Alcohol more than 3/day or more than 7/wk No   Do you have a current opioid prescription? No   How severe/bad is pain from 1 to 10? 3/10   Do you use any other substances recreationally? (!) ALCOHOL     Social History     Tobacco Use    Smoking status: Every Day     Packs/day: 0.25     Years: 54.00     Additional pack years: 0.00     Total pack years: 13.50     Types: Cigarettes    Smokeless tobacco: Never   Vaping Use    Vaping Use: Never used   Substance Use Topics    Alcohol use: Yes     Alcohol/week: 3.0 standard drinks of alcohol     Types: 3 Standard drinks or equivalent per week       ASCVD Risk   The ASCVD Risk score (Estuardo DK, et al., 2019) failed to calculate for the following reasons:    The valid total cholesterol range is 130 to 320 mg/dL            Reviewed and updated as needed this visit by Provider                      Current providers sharing in care for this patient include:  Patient Care Team:  Carlitos Espinosa MD as PCP - General (Internal Medicine)  Matty Najera OD as PCP - Ophthalmology (Ophthalmology)  Carlitos Espinosa MD as Assigned PCP    The following health maintenance items are reviewed in Epic and correct as of today:  Health Maintenance   Topic Date Due    DTAP/TDAP/TD IMMUNIZATION (1 -  "Tdap) Never done    RSV VACCINE (Pregnancy & 60+) (1 - 1-dose 60+ series) Never done    ZOSTER IMMUNIZATION (2 of 2) 12/28/2020    EYE EXAM  05/02/2023    BMP  02/17/2024    LIPID  02/17/2024    MICROALBUMIN  02/17/2024    DIABETIC FOOT EXAM  02/27/2024    NICOTINE/TOBACCO CESSATION COUNSELING Q 1 YR  02/27/2024    A1C  05/29/2024    MEDICARE ANNUAL WELLNESS VISIT  02/28/2025    ANNUAL REVIEW OF HM ORDERS  02/28/2025    FALL RISK ASSESSMENT  02/28/2025    ADVANCE CARE PLANNING  02/27/2028    COLORECTAL CANCER SCREENING  04/28/2028    HEPATITIS C SCREENING  Completed    PHQ-2 (once per calendar year)  Completed    INFLUENZA VACCINE  Completed    Pneumococcal Vaccine: 65+ Years  Completed    AORTIC ANEURYSM SCREENING (SYSTEM ASSIGNED)  Completed    COVID-19 Vaccine  Completed    IPV IMMUNIZATION  Aged Out    HPV IMMUNIZATION  Aged Out    MENINGITIS IMMUNIZATION  Aged Out    RSV MONOCLONAL ANTIBODY  Aged Out    LUNG CANCER SCREENING  Discontinued         Review of Systems  Constitutional, HEENT, cardiovascular, pulmonary, GI, , musculoskeletal, neuro, skin, endocrine and psych systems are negative, except as otherwise noted.     Objective    Exam  /74   Pulse 56   Temp (!) 96.7  F (35.9  C) (Tympanic)   Resp 20   Ht 1.746 m (5' 8.75\")   Wt 104.8 kg (231 lb)   BMI 34.36 kg/m     Estimated body mass index is 34.36 kg/m  as calculated from the following:    Height as of this encounter: 1.746 m (5' 8.75\").    Weight as of this encounter: 104.8 kg (231 lb).    Physical Exam  Healthy-appearing overweight man in no distress  On examination of the eyes there is a small mass right lower lid approximately 4 mm in size without irritation  HEENT exam reveals scarring of the TMs without erythema.  Crowded oropharynx.  Mallampati 3  Neck supple no neck or thyromegaly  Lungs clear  Cardiac exam regular no murmur or edema.  Normal carotid pulsations  Abdomen soft and nontender  Alert, oriented, speech fluent, memory " intact, no facial asymmetry, EOMI, normal strength and gait  Normal mood and affect  Feet in good condition with intact sensation to monofilament and vibration.  Posterior tibial pulsations normal bilaterally.        2/29/2024   Mini Cog   Clock Draw Score 2 Normal   3 Item Recall 2 objects recalled   Mini Cog Total Score 4       EKG-sinus bradycardia, incomplete right bundle branch block, otherwise normal  Recent Results (from the past 720 hour(s))   HEMOGLOBIN A1C    Collection Time: 02/29/24 10:27 AM   Result Value Ref Range    Hemoglobin A1C 7.4 (H) 0.0 - 5.6 %   CBC with platelets    Collection Time: 02/29/24 10:27 AM   Result Value Ref Range    WBC Count 6.2 4.0 - 11.0 10e3/uL    RBC Count 5.01 4.40 - 5.90 10e6/uL    Hemoglobin 14.6 13.3 - 17.7 g/dL    Hematocrit 44.4 40.0 - 53.0 %    MCV 89 78 - 100 fL    MCH 29.1 26.5 - 33.0 pg    MCHC 32.9 31.5 - 36.5 g/dL    RDW 13.1 10.0 - 15.0 %    Platelet Count 243 150 - 450 10e3/uL          Signed Electronically by: Carlitos Espinosa MD

## 2024-02-29 NOTE — ASSESSMENT & PLAN NOTE
Episode with symptoms and controlled rate 4/9/21  [id number]  No symptoms  Rivaroxaban 20 mg daily  EXAM DATE: 05/13/2010    PATIENT NAME: JAMES MCCRACKEN    EXAM: NM NM MYOCARDIAL ECT MULTIPLE STDYS / NM NM MYOCARDIAL PERF W/EJEC FRAC / NM NM OTHER    Patient History:  CHEST PAIN    Study Information:  MYOCARDIAL PERFUSION SCAN WITH GATED SPECT    INDICATION: Chest pain.  TECHNIQUE: 12.0 mCi of Tc-99m Myoview was injected at rest. Imaging of the heart was obtained with SPECT. 25.0 mCi of Tc-99m Myoview was then injected during the performance of an exercise stress test. The patient reached a maximum heart rate of 146 ( 89 % M.P.H.R.). Imaging of the heart repeated with gated SPECT.    FINDINGS: The left ventricular myocardial perfusion appears to be within normal limits. Decreased activity appears to be associated with the inferior wall on the post stress images. No significant change is appreciated on the rest images. No regional wall motion abnormalities identified on the gated SPECT images. The inferior wall appears to move and thicken within normal limits. The left ventricular ejection fraction is calculated to be 60%.    CONCLUSION:    1. Negative study. No myocardial ischemia identified.    2. Diaphragmatic attenuation artifact inferior wall.

## 2024-02-29 NOTE — PATIENT INSTRUCTIONS
Preventive Care Advice   This is general advice given by our system to help you stay healthy. However, your care team may have specific advice just for you. Please talk to your care team about your preventive care needs.  Nutrition  Eat 5 or more servings of fruits and vegetables each day.  Try wheat bread, brown rice and whole grain pasta (instead of white bread, rice, and pasta).  Get enough calcium and vitamin D. Check the label on foods and aim for 100% of the RDA (recommended daily allowance).  Lifestyle  Exercise at least 150 minutes each week   (30 minutes a day, 5 days a week).  Do muscle strengthening activities 2 days a week. These help control your weight and prevent disease.  No smoking.  Wear sunscreen to prevent skin cancer.  Have a dental exam and cleaning every 6 months.  Yearly exams  See your health care team every year to talk about:  Any changes in your health.  Any medicines your care team has prescribed.  Preventive care, family planning, and ways to prevent chronic diseases.  Shots (vaccines)   HPV shots (up to age 26), if you've never had them before.  Hepatitis B shots (up to age 59), if you've never had them before.  COVID-19 shot: Get this shot when it's due.  Flu shot: Get a flu shot every year.  Tetanus shot: Get a tetanus shot every 10 years.  Pneumococcal, hepatitis A, and RSV shots: Ask your care team if you need these based on your risk.  Shingles shot (for age 50 and up).  General health tests  Diabetes screening:  Starting at age 35, Get screened for diabetes at least every 3 years.  If you are younger than age 35, ask your care team if you should be screened for diabetes.  Cholesterol test: At age 39, start having a cholesterol test every 5 years, or more often if advised.  Bone density scan (DEXA): At age 50, ask your care team if you should have this scan for osteoporosis (brittle bones).  Hepatitis C: Get tested at least once in your life.  STIs (sexually transmitted  infections)  Before age 24: Ask your care team if you should be screened for STIs.  After age 24: Get screened for STIs if you're at risk. You are at risk for STIs (including HIV) if:  You are sexually active with more than one person.  You don't use condoms every time.  You or a partner was diagnosed with a sexually transmitted infection.  If you are at risk for HIV, ask about PrEP medicine to prevent HIV.  Get tested for HIV at least once in your life, whether you are at risk for HIV or not.  Cancer screening tests  Cervical cancer screening: If you have a cervix, begin getting regular cervical cancer screening tests at age 21. Most people who have regular screenings with normal results can stop after age 65. Talk about this with your provider.  Breast cancer scan (mammogram): If you've ever had breasts, begin having regular mammograms starting at age 40. This is a scan to check for breast cancer.  Colon cancer screening: It is important to start screening for colon cancer at age 45.  Have a colonoscopy test every 10 years (or more often if you're at risk) Or, ask your provider about stool tests like a FIT test every year or Cologuard test every 3 years.  To learn more about your testing options, visit: https://www.[employer]/705301.pdf.  For help making a decision, visit: https://bit.ly/bj81554.  Prostate cancer screening test: If you have a prostate and are age 55 to 69, ask your provider if you would benefit from a yearly prostate cancer screening test.  Lung cancer screening: If you are a current or former smoker age 50 to 80, ask your care team if ongoing lung cancer screenings are right for you.  For informational purposes only. Not to replace the advice of your health care provider. Copyright   2023 [employer]. All rights reserved. Clinically reviewed by the [facility] Transitions Program. "[employer] 444519 - REV 01/24.    Learning About Activities of Daily Living  What are activities  of daily living?     Activities of daily living (ADLs) are the basic self-care tasks you do every day. As you age, and if you have health problems, you may find that it's harder to do these things for yourself. That's when you may need some help.  Your doctor uses ADLs to measure how much help you need. Knowing what you can and can't do for yourself is an important first step to getting help. And when you have the help you need, you can stay as independent as possible.  Your doctor will want to know if you are able to do tasks such as:  Take a bath or shower without help.  Go to the bathroom by yourself.  Dress and undress without help.  Shave, comb your hair, and brush teeth on your own.  Get in and out of bed or a chair without help.  Feed yourself without help.  If you are having trouble doing basic self-care tasks, talk with your doctor. You may want to bring a caregiver or family member who can help the doctor understand your needs and abilities.  How will a doctor assess your ADLs?  Asking about ADLs is part of a routine health checkup your doctor will likely do as you age. Your health check might be done in a doctor's office, in your home, or at a hospital. The goal is to find out if you are having any problems that could make your health problems worse or that make it unsafe for you to be on your own.  To measure your ADLs, your doctor will ask how hard it is for you to do routine tasks. He or she may also want to know if you have changed the way you do a task because of a health problem. He or she may watch how you:  Walk back and forth.  Keep your balance while you stand or walk.  Move from sitting to standing or from a bed to a chair.  Button or unbutton a shirt or sweater.  Remove and put on your shoes.  It's normal to feel a little worried or anxious if you find you can't do all the things you used to be able to do. Talking with your doctor about ADLs isn't a test that you either pass or fail. It's just  a way to get more information about your health and safety.  Follow-up care is a key part of your treatment and safety. Be sure to make and go to all appointments, and call your doctor if you are having problems. It's also a good idea to know your test results and keep a list of the medicines you take.  Current as of: February 26, 2023               Content Version: 13.8    8421-6583 [employer]   Care instructions adapted under license by your healthcare professional. If you have questions about a medical condition or this instruction, always ask your healthcare professional. [employer] disclaims any warranty or liability for your use of this information.      Hearing Loss: Care Instructions  Overview     Hearing loss is a sudden or slow decrease in how well you hear. It can range from slight to profound. Permanent hearing loss can occur with aging. It also can happen when you are exposed long-term to loud noise. Examples include listening to loud music, riding motorcycles, or being around other loud machines.  Hearing loss can affect your work and home life. It can make you feel lonely or depressed. You may feel that you have lost your independence. But hearing aids and other devices can help you hear better and feel connected to others.  Follow-up care is a key part of your treatment and safety. Be sure to make and go to all appointments, and call your doctor if you are having problems. It's also a good idea to know your test results and keep a list of the medicines you take.  How can you care for yourself at home?  Avoid loud noises whenever possible. This helps keep your hearing from getting worse.  Always wear hearing protection around loud noises.  Wear a hearing aid as directed.  A professional can help you pick a hearing aid that will work best for you.  You can also get hearing aids over the counter for mild to moderate hearing loss.  Have hearing tests as your doctor suggests.  "They can show whether your hearing has changed. Your hearing aid may need to be adjusted.  Use other devices as needed. These may include:  Telephone amplifiers and hearing aids that can connect to a television, stereo, radio, or microphone.  Devices that use lights or vibrations. These alert you to the doorbell, a ringing telephone, or a baby monitor.  Television closed-captioning. This shows the words at the bottom of the screen. Most new TVs can do this.  TTY (text telephone). This lets you type messages back and forth on the telephone instead of talking or listening. These devices are also called TDD. When messages are typed on the keyboard, they are sent over the phone line to a receiving TTY. The message is shown on a monitor.  Use text messaging, social media, and email if it is hard for you to communicate by telephone.  Try to learn a listening technique called speechreading. It is not lipreading. You pay attention to people's gestures, expressions, posture, and tone of voice. These clues can help you understand what a person is saying. Face the person you are talking to, and have them face you. Make sure the lighting is good. You need to see the other person's face clearly.  Think about counseling if you need help to adjust to your hearing loss.  When should you call for help?  Watch closely for changes in your health, and be sure to contact your doctor if:    You think your hearing is getting worse.     You have new symptoms, such as dizziness or nausea.   Where can you learn more?  Go to https://www.[employer].net/patiented  Enter R798 in the search box to learn more about \"Hearing Loss: Care Instructions.\"  Current as of: February 28, 2023               Content Version: 13.8    0308-8968 [employer].   Care instructions adapted under license by your healthcare professional. If you have questions about a medical condition or this instruction, always ask your healthcare professional. " [employer], [facility] disclaims any warranty or liability for your use of this information.      Learning About Stress  What is stress?     Stress is your body's response to a hard situation. Your body can have a physical, emotional, or mental response. Stress is a fact of life for most people, and it affects everyone differently. What causes stress for you may not be stressful for someone else.  A lot of things can cause stress. You may feel stress when you go on a job interview, take a test, or run a race. This kind of short-term stress is normal and even useful. It can help you if you need to work hard or react quickly. For example, stress can help you finish an important job on time.  Long-term stress is caused by ongoing stressful situations or events. Examples of long-term stress include long-term health problems, ongoing problems at work, or conflicts in your family. Long-term stress can harm your health.  How does stress affect your health?  When you are stressed, your body responds as though you are in danger. It makes hormones that speed up your heart, make you breathe faster, and give you a burst of energy. This is called the fight-or-flight stress response. If the stress is over quickly, your body goes back to normal and no harm is done.  But if stress happens too often or lasts too long, it can have bad effects. Long-term stress can make you more likely to get sick, and it can make symptoms of some diseases worse. If you tense up when you are stressed, you may develop neck, shoulder, or low back pain. Stress is linked to high blood pressure and heart disease.  Stress also harms your emotional health. It can make you jenkins, tense, or depressed. Your relationships may suffer, and you may not do well at work or school.  What can you do to manage stress?  You can try these things to help manage stress:   Do something active. Exercise or activity can help reduce stress. Walking is a great way to get  started. Even everyday activities such as housecleaning or yard work can help.  Try yoga or matthew chi. These techniques combine exercise and meditation. You may need some training at first to learn them.  Do something you enjoy. For example, listen to music or go to a movie. Practice your hobby or do volunteer work.  Meditate. This can help you relax, because you are not worrying about what happened before or what may happen in the future.  Do guided imagery. Imagine yourself in any setting that helps you feel calm. You can use online videos, books, or a teacher to guide you.  Do breathing exercises. For example:  From a standing position, bend forward from the waist with your knees slightly bent. Let your arms dangle close to the floor.  Breathe in slowly and deeply as you return to a standing position. Roll up slowly and lift your head last.  Hold your breath for just a few seconds in the standing position.  Breathe out slowly and bend forward from the waist.  Let your feelings out. Talk, laugh, cry, and express anger when you need to. Talking with supportive friends or family, a counselor, or a evangelista leader about your feelings is a healthy way to relieve stress. Avoid discussing your feelings with people who make you feel worse.  Write. It may help to write about things that are bothering you. This helps you find out how much stress you feel and what is causing it. When you know this, you can find better ways to cope.  What can you do to prevent stress?  You might try some of these things to help prevent stress:  Manage your time. This helps you find time to do the things you want and need to do.  Get enough sleep. Your body recovers from the stresses of the day while you are sleeping.  Get support. Your family, friends, and community can make a difference in how you experience stress.  Limit your news feed. Avoid or limit time on social media or news that may make you feel stressed.  Do something active. Exercise  "or activity can help reduce stress. Walking is a great way to get started.  Where can you learn more?  Go to https://www.[employer].net/patiented  Enter N032 in the search box to learn more about \"Learning About Stress.\"  Current as of: February 26, 2023               Content Version: 13.8    6953-2750 [employer].   Care instructions adapted under license by your healthcare professional. If you have questions about a medical condition or this instruction, always ask your healthcare professional. [employer] disclaims any warranty or liability for your use of this information.      Substance Use Disorder: Care Instructions  Overview     You can improve your life and health by stopping your use of alcohol or drugs. When you don't drink or use drugs, you may feel and sleep better. You may get along better with your family, friends, and coworkers. There are medicines and programs that can help with substance use disorder.  How can you care for yourself at home?  Here are some ways to help you stay sober and prevent relapse.  If you have been given medicine to help keep you sober or reduce your cravings, be sure to take it exactly as prescribed.  Talk to your doctor about programs that can help you stop using drugs or drinking alcohol.  Do not keep alcohol or drugs in your home.  Plan ahead. Think about what you'll say if other people ask you to drink or use drugs. Try not to spend time with people who drink or use drugs.  Use the time and money spent on drinking or drugs to do something that's important to you.  Preventing a relapse  Have a plan to deal with relapse. Learn to recognize changes in your thinking that lead you to drink or use drugs. Get help before you start to drink or use drugs again.  Try to stay away from situations, friends, or places that may lead you to drink or use drugs.  If you feel the need to drink alcohol or use drugs again, seek help right away. Call a trusted " friend or family member. Some people get support from organizations such as Narcotics Anonymous or [employer] or from treatment facilities.  If you relapse, get help as soon as you can. Some people make a plan with another person that outlines what they want that person to do for them if they relapse. The plan usually includes how to handle the relapse and who to notify in case of relapse.  Don't give up. Remember that a relapse doesn't mean that you have failed. Use the experience to learn the triggers that lead you to drink or use drugs. Then quit again. Recovery is a lifelong process. Many people have several relapses before they are able to quit for good.  Follow-up care is a key part of your treatment and safety. Be sure to make and go to all appointments, and call your doctor if you are having problems. It's also a good idea to know your test results and keep a list of the medicines you take.  When should you call for help?   Call 911  anytime you think you may need emergency care. For example, call if you or someone else:    Has overdosed or has withdrawal signs. Be sure to tell the emergency workers that you are or someone else is using or trying to quit using drugs. Overdose or withdrawal signs may include:  Losing consciousness.  Seizure.  Seeing or hearing things that aren't there (hallucinations).     Is thinking or talking about suicide or harming others.   Where to get help 24 hours a day, 7 days a week   If you or someone you know talks about suicide, self-harm, a mental health crisis, a substance use crisis, or any other kind of emotional distress, get help right away. You can:    Call the Suicide and Crisis Lifeline at 665.     Call 3-148-065-TALK (1-873.229.2366).     Text HOME to 255207 to access the Crisis Text Line.   Consider saving these numbers in your phone.  Go to [employer] for more information or to chat online.  Call your doctor now or seek immediate medical care if:    You are  "having withdrawal symptoms. These may include nausea or vomiting, sweating, shakiness, and anxiety.   Watch closely for changes in your health, and be sure to contact your doctor if:    You have a relapse.     You need more help or support to stop.   Where can you learn more?  Go to https://www.[employer].net/patiented  Enter H573 in the search box to learn more about \"Substance Use Disorder: Care Instructions.\"  Current as of: March 21, 2023               Content Version: 13.8    4766-8119 [employer].   Care instructions adapted under license by your healthcare professional. If you have questions about a medical condition or this instruction, always ask your healthcare professional. [employer] disclaims any warranty or liability for your use of this information.      "

## 2024-02-29 NOTE — ASSESSMENT & PLAN NOTE
Diabetes is improving/stable/worsening: unchanged.  Continue current treatment regimen., Discussed foot care., Reminded to get yearly retinal exam., Medication changes per orders., and Diabetes educator referral.  Diabetes will be reassessed in 6 months.

## 2024-03-05 NOTE — TELEPHONE ENCOUNTER
Script was sent to old pharmacy. Patients insurance changed pharmacies and patient will call back with the pharmacy name. It is a mail order home delivery pharmacy.

## 2024-03-06 ENCOUNTER — HOSPITAL ENCOUNTER (OUTPATIENT)
Dept: CARDIOLOGY | Facility: CLINIC | Age: 74
Discharge: HOME | End: 2024-03-06
Attending: INTERNAL MEDICINE | Admitting: INTERNAL MEDICINE
Payer: MEDICARE

## 2024-03-06 DIAGNOSIS — I48.3 TYPICAL ATRIAL FLUTTER (H): ICD-10-CM

## 2024-03-06 LAB — LVEF ECHO: NORMAL

## 2024-03-06 PROCEDURE — 93306 TTE W/DOPPLER COMPLETE: CPT

## 2024-03-06 PROCEDURE — 93306 TTE W/DOPPLER COMPLETE: CPT | Mod: 26 | Performed by: INTERNAL MEDICINE

## 2024-03-12 DIAGNOSIS — E78.5 DYSLIPIDEMIA: ICD-10-CM

## 2024-03-12 DIAGNOSIS — I48.3 TYPICAL ATRIAL FLUTTER (H): ICD-10-CM

## 2024-03-12 RX ORDER — SIMVASTATIN 40 MG
40 TABLET ORAL AT BEDTIME
Qty: 90 TABLET | Refills: 3 | Status: CANCELLED | OUTPATIENT
Start: 2024-03-12 | End: 2025-03-07

## 2024-03-12 RX ORDER — SIMVASTATIN 40 MG
40 TABLET ORAL AT BEDTIME
Qty: 90 TABLET | Refills: 1 | Status: SHIPPED | OUTPATIENT
Start: 2024-03-12 | End: 2024-08-21

## 2024-03-12 NOTE — TELEPHONE ENCOUNTER
Medication Question or Refill  Contacts         Type Contact Phone/Fax    03/12/2024 08:55 AM CDT Phone (Incoming) [facility] - [address] (Pharmacy) [phone]          What medication are you calling about (include dose and sig)?:      Preferred Pharmacy:  [facility] - [place], [place] - [address] [place] [id number]  Phone: [phone] Fax: [phone]    Controlled Substance Agreement on file:   CSA -- Patient Level:    CSA: None found at the patient level.       Who prescribed the medication?: JDL    Do you need a refill? Yes    Do the caller have any questions or concerns?  Yes: Apparently 2 requests had been sent over already; the patient is at risk of running out of medication.  The pharmacy is asking for 90 days and 3 refills.

## 2024-04-02 ENCOUNTER — NURSE TRIAGE (OUTPATIENT)
Dept: NURSING | Facility: CLINIC | Age: 74
End: 2024-04-02

## 2024-04-02 NOTE — TELEPHONE ENCOUNTER
Nurse Triage SBAR    Is this a 2nd Level Triage? NO    Situation: Irregular heartbeats    Assessment: Irregular heart beats started Sunday morning, felt like beating faster, is having some chest tightness and shortness of breath this morning, Sunday felt lightheadedness.        Protocol Recommended Disposition:   GO to ED now, wife will take to [facility]     Mary Talbert RN on 4/2/2024 at 7:54 AM      Reason for Disposition   Difficulty breathing    Additional Information   Negative: Passed out (i.e., lost consciousness, collapsed and was not responding)   Negative: Shock suspected (e.g., cold/pale/clammy skin, too weak to stand, low BP, rapid pulse)   Negative: Difficult to awaken or acting confused (e.g., disoriented, slurred speech)   Negative: Visible sweat on face or sweat dripping down face   Negative: Unable to walk, or can only walk with assistance (e.g., requires support)   Negative: Received SHOCK from implantable cardiac defibrillator and has persisting symptoms (i.e., palpitations, lightheadedness)   Negative: Dizziness, lightheadedness, or weakness and heart beating very rapidly (e.g., > 140 / minute)   Negative: Dizziness, lightheadedness, or weakness and heart beating very slowly (e.g., < 50 / minute)   Negative: Sounds like a life-threatening emergency to the triager   Negative: Chest pain    Protocols used: Heart Rate and Heartbeat [id number]

## 2024-06-06 DIAGNOSIS — I48.3 TYPICAL ATRIAL FLUTTER (H): ICD-10-CM

## 2024-06-07 RX ORDER — RIVAROXABAN 20 MG/1
20 TABLET, FILM COATED ORAL EVERY EVENING
Qty: 90 TABLET | Refills: 3 | Status: SHIPPED | OUTPATIENT
Start: 2024-06-07

## 2024-07-27 ENCOUNTER — HEALTH MAINTENANCE LETTER (OUTPATIENT)
Age: 74
End: 2024-07-27

## 2024-08-21 DIAGNOSIS — E78.5 DYSLIPIDEMIA: ICD-10-CM

## 2024-08-21 RX ORDER — SIMVASTATIN 40 MG
40 TABLET ORAL AT BEDTIME
Qty: 90 TABLET | Refills: 1 | Status: SHIPPED | OUTPATIENT
Start: 2024-08-21

## 2024-12-14 ENCOUNTER — HEALTH MAINTENANCE LETTER (OUTPATIENT)
Age: 74
End: 2024-12-14

## 2024-12-18 ENCOUNTER — NURSE TRIAGE (OUTPATIENT)
Dept: FAMILY MEDICINE | Facility: CLINIC | Age: 74
End: 2024-12-18
Payer: COMMERCIAL

## 2024-12-18 NOTE — TELEPHONE ENCOUNTER
"Reason for Disposition   Taking Coumadin (warfarin) or other strong blood thinner, or known bleeding disorder (e.g., thrombocytopenia)    Additional Information   Negative: Shock suspected (e.g., cold/pale/clammy skin, too weak to stand, low BP, rapid pulse)   Negative: Sounds like a life-threatening emergency to the triager   Negative: Urinary catheter, questions about   Negative: Recent back or abdominal injury   Negative: Recent genital injury   Negative: Unable to urinate (or only a few drops) > 4 hours and bladder feels very full (e.g., palpable bladder or strong urge to urinate)   Negative: Diffuse (all over) muscle pains in the shoulders, arms, legs, and back and dark (cola or tea-colored) or red-colored urine   Negative: Passing pure blood or large blood clots (i.e., larger than a dime or grape)   Negative: Fever > 100.4 F (38.0 C)   Negative: Patient sounds very sick or weak to the triager   Negative: Known sickle cell disease    Answer Assessment - Initial Assessment Questions  1. COLOR of URINE: \"Describe the color of the urine.\"  (e.g., tea-colored, pink, red, bloody) \"Do you have blood clots in your urine?\" (e.g., none, pea, grape, small coin)      Bright red, no  2. ONSET: \"When did the bleeding start?\"       Yesterday   3. EPISODES: \"How many times has there been blood in the urine?\" or \"How many times today?\"      3 yesterday but not today  4. PAIN with URINATION: \"Is there any pain with passing your urine?\" If Yes, ask: \"How bad is the pain?\"  (Scale 1-10; or mild, moderate, severe)     - MILD: Complains slightly about urination hurting.     - MODERATE: Interferes with normal activities.       - SEVERE: Excruciating, unwilling or unable to urinate because of the pain.       No   5. FEVER: \"Do you have a fever?\" If Yes, ask: \"What is your temperature, how was it measured, and when did it start?\"      No   6. ASSOCIATED SYMPTOMS: \"Are you passing urine more frequently than usual?\"      Last night   7. " "OTHER SYMPTOMS: \"Do you have any other symptoms?\" (e.g., back/flank pain, abdomen pain, vomiting)      Discomfort in base of stomach  8. PREGNANCY: \"Is there any chance you are pregnant?\" \"When was your last menstrual period?\"      N/a    Protocols used: Urine - Blood In-A-OH    Tasha Parra RN    "

## 2024-12-19 ENCOUNTER — OFFICE VISIT (OUTPATIENT)
Dept: FAMILY MEDICINE | Facility: CLINIC | Age: 74
End: 2024-12-19
Payer: COMMERCIAL

## 2024-12-19 VITALS
RESPIRATION RATE: 16 BRPM | WEIGHT: 231.8 LBS | DIASTOLIC BLOOD PRESSURE: 70 MMHG | HEART RATE: 64 BPM | SYSTOLIC BLOOD PRESSURE: 112 MMHG | HEIGHT: 69 IN | BODY MASS INDEX: 34.33 KG/M2 | TEMPERATURE: 97.1 F | OXYGEN SATURATION: 97 %

## 2024-12-19 DIAGNOSIS — Z12.5 ENCOUNTER FOR SCREENING FOR MALIGNANT NEOPLASM OF PROSTATE: ICD-10-CM

## 2024-12-19 DIAGNOSIS — B96.89 ACUTE BACTERIAL RHINOSINUSITIS: ICD-10-CM

## 2024-12-19 DIAGNOSIS — E11.9 TYPE 2 DIABETES MELLITUS WITHOUT COMPLICATION, WITHOUT LONG-TERM CURRENT USE OF INSULIN (H): Chronic | ICD-10-CM

## 2024-12-19 DIAGNOSIS — J01.90 ACUTE BACTERIAL RHINOSINUSITIS: ICD-10-CM

## 2024-12-19 DIAGNOSIS — R31.9 HEMATURIA, UNSPECIFIED TYPE: Primary | ICD-10-CM

## 2024-12-19 LAB
ALBUMIN SERPL BCG-MCNC: 3.6 G/DL (ref 3.5–5.2)
ALBUMIN UR-MCNC: 30 MG/DL
ALP SERPL-CCNC: 92 U/L (ref 40–150)
ALT SERPL W P-5'-P-CCNC: 29 U/L (ref 0–70)
ANION GAP SERPL CALCULATED.3IONS-SCNC: 9 MMOL/L (ref 7–15)
APPEARANCE UR: ABNORMAL
AST SERPL W P-5'-P-CCNC: 24 U/L (ref 0–45)
BACTERIA #/AREA URNS HPF: ABNORMAL /HPF
BASOPHILS # BLD AUTO: 0 10E3/UL (ref 0–0.2)
BASOPHILS NFR BLD AUTO: 0 %
BILIRUB SERPL-MCNC: 0.4 MG/DL
BILIRUB UR QL STRIP: NEGATIVE
BUN SERPL-MCNC: 12.8 MG/DL (ref 8–23)
CALCIUM SERPL-MCNC: 8.7 MG/DL (ref 8.8–10.4)
CHLORIDE SERPL-SCNC: 104 MMOL/L (ref 98–107)
COLOR UR AUTO: YELLOW
CREAT SERPL-MCNC: 1.11 MG/DL (ref 0.67–1.17)
EGFRCR SERPLBLD CKD-EPI 2021: 71 ML/MIN/1.73M2
EOSINOPHIL # BLD AUTO: 0.3 10E3/UL (ref 0–0.7)
EOSINOPHIL NFR BLD AUTO: 4 %
ERYTHROCYTE [DISTWIDTH] IN BLOOD BY AUTOMATED COUNT: 13.1 % (ref 10–15)
EST. AVERAGE GLUCOSE BLD GHB EST-MCNC: 166 MG/DL
GLUCOSE SERPL-MCNC: 140 MG/DL (ref 70–99)
GLUCOSE UR STRIP-MCNC: NEGATIVE MG/DL
HBA1C MFR BLD: 7.4 % (ref 0–5.6)
HCO3 SERPL-SCNC: 27 MMOL/L (ref 22–29)
HCT VFR BLD AUTO: 45.8 % (ref 40–53)
HGB BLD-MCNC: 14.9 G/DL (ref 13.3–17.7)
HGB UR QL STRIP: ABNORMAL
IMM GRANULOCYTES # BLD: 0 10E3/UL
IMM GRANULOCYTES NFR BLD: 0 %
KETONES UR STRIP-MCNC: NEGATIVE MG/DL
LEUKOCYTE ESTERASE UR QL STRIP: NEGATIVE
LYMPHOCYTES # BLD AUTO: 1.6 10E3/UL (ref 0.8–5.3)
LYMPHOCYTES NFR BLD AUTO: 19 %
MCH RBC QN AUTO: 28.9 PG (ref 26.5–33)
MCHC RBC AUTO-ENTMCNC: 32.5 G/DL (ref 31.5–36.5)
MCV RBC AUTO: 89 FL (ref 78–100)
MONOCYTES # BLD AUTO: 1 10E3/UL (ref 0–1.3)
MONOCYTES NFR BLD AUTO: 12 %
MUCOUS THREADS #/AREA URNS LPF: PRESENT /LPF
NEUTROPHILS # BLD AUTO: 5.5 10E3/UL (ref 1.6–8.3)
NEUTROPHILS NFR BLD AUTO: 65 %
NITRATE UR QL: NEGATIVE
PH UR STRIP: 5.5 [PH] (ref 5–7)
PLATELET # BLD AUTO: 253 10E3/UL (ref 150–450)
POTASSIUM SERPL-SCNC: 4.3 MMOL/L (ref 3.4–5.3)
PROT SERPL-MCNC: 6.7 G/DL (ref 6.4–8.3)
PSA SERPL DL<=0.01 NG/ML-MCNC: 1.52 NG/ML (ref 0–6.5)
RBC # BLD AUTO: 5.16 10E6/UL (ref 4.4–5.9)
RBC #/AREA URNS AUTO: >100 /HPF
SODIUM SERPL-SCNC: 140 MMOL/L (ref 135–145)
SP GR UR STRIP: >=1.03 (ref 1–1.03)
SQUAMOUS #/AREA URNS AUTO: ABNORMAL /LPF
UROBILINOGEN UR STRIP-ACNC: 0.2 E.U./DL
WBC # BLD AUTO: 8.5 10E3/UL (ref 4–11)
WBC #/AREA URNS AUTO: ABNORMAL /HPF

## 2024-12-19 RX ORDER — METOPROLOL TARTRATE 25 MG/1
25 TABLET, FILM COATED ORAL 2 TIMES DAILY
COMMUNITY
Start: 2024-04-30

## 2024-12-19 NOTE — PROGRESS NOTES
"  {PROVIDER CHARTING PREFERENCE:169528}    Marta Redman is a 74 year old, presenting for the following health issues:  Urinary Problem (Blood in urine x 2 days )      12/19/2024     8:44 AM   Additional Questions   Roomed by ELO Carranza     History of Present Illness       Reason for visit:  Head cold and bloody urine  Symptom onset:  1-3 days ago  Symptom intensity:  Mild  Symptom progression:  Improving  Had these symptoms before:  No  What makes it worse:  No  What makes it better:  No   He is taking medications regularly.       {MA/LPN/RN Pre-Provider Visit Orders- hCG/UA/Strep (Optional):024083}  {SUPERLIST (Optional):897552}  {additonal problems for provider to add (Optional):314136}    {ROS Picklists (Optional):868901}      Objective    /70   Pulse 64   Temp 97.1  F (36.2  C) (Tympanic)   Resp 16   Ht 1.746 m (5' 8.75\")   Wt 105.1 kg (231 lb 12.8 oz)   SpO2 97%   BMI 34.48 kg/m    Body mass index is 34.48 kg/m .  Physical Exam   {Exam List (Optional):158784}    {Diagnostic Test Results (Optional):360650}        Signed Electronically by: Jacinto Fernandez MD  {Email feedback regarding this note to [email or web address]   :[id number]}  "

## 2024-12-19 NOTE — PATIENT INSTRUCTIONS
Thank you for visiting us today.    Below are a summary of my recommendations as discussed during your visit:    For your blood in the urine since this is a first occurrence and the exam was overall normal if labs are okay no need for further evaluation except that I recommend another urine test in 1 to 2 months even if you do not see any blood in the urine.   Labs were ordered and pending and we will keep you posted of test results.Drink plenty of fluid as this may thin your nasal secretions.    For your sinus infection:  Take your antibiotic with food, you may consider taking probiotics such as culturelle, florajen, florastor etc to reduce chances of getting secondary diarrhea from the antibiotic. You can get this over the counter  I recommend you use sinus irrigation particularly before you go to bed and in the morning when you wake up, but you may also do it throughout the day. This may improve your cough as it helps remove some mucus plugging that can cause postnasal drip. You may buy a Neti Pot or sinus rinsing bottle such as NeilMed to do this.         Don't hesitate to contact us if you have any questions    Dr Nice (Jacinto Fernandez MD)

## 2025-01-16 ENCOUNTER — LAB (OUTPATIENT)
Dept: LAB | Facility: CLINIC | Age: 75
End: 2025-01-16
Payer: COMMERCIAL

## 2025-01-16 DIAGNOSIS — R31.9 HEMATURIA, UNSPECIFIED TYPE: ICD-10-CM

## 2025-01-16 DIAGNOSIS — R31.0 GROSS HEMATURIA: Primary | ICD-10-CM

## 2025-01-16 LAB
ALBUMIN UR-MCNC: NEGATIVE MG/DL
APPEARANCE UR: CLEAR
BACTERIA #/AREA URNS HPF: ABNORMAL /HPF
BILIRUB UR QL STRIP: NEGATIVE
COLOR UR AUTO: YELLOW
GLUCOSE UR STRIP-MCNC: NEGATIVE MG/DL
HGB UR QL STRIP: ABNORMAL
KETONES UR STRIP-MCNC: NEGATIVE MG/DL
LEUKOCYTE ESTERASE UR QL STRIP: NEGATIVE
MUCOUS THREADS #/AREA URNS LPF: PRESENT /LPF
NITRATE UR QL: NEGATIVE
PH UR STRIP: 5.5 [PH] (ref 5–7)
RBC #/AREA URNS AUTO: ABNORMAL /HPF
SP GR UR STRIP: 1.02 (ref 1–1.03)
SQUAMOUS #/AREA URNS AUTO: ABNORMAL /LPF
UROBILINOGEN UR STRIP-ACNC: 0.2 E.U./DL
WBC #/AREA URNS AUTO: ABNORMAL /HPF

## 2025-01-16 NOTE — LETTER
January 16, 2025      Feroz Escobar  [address] [place] [address]        Dear ,    We are writing to inform you of your test results.    Because of the visible blood in your urine you should see a urologist.  I put in a referral to [facility] and [facility] at the [facility].  My staff will contact you with the contact information.     Resulted Orders   UA Macroscopic with reflex to Microscopic and Culture - Lab Collect   Result Value Ref Range    Color Urine Yellow Colorless, Straw, Light Yellow, Yellow    Appearance Urine Clear Clear    Glucose Urine Negative Negative mg/dL    Bilirubin Urine Negative Negative    Ketones Urine Negative Negative mg/dL    Specific Gravity Urine 1.025 1.003 - 1.035    Blood Urine Trace (A) Negative    pH Urine 5.5 5.0 - 7.0    Protein Albumin Urine Negative Negative mg/dL    Urobilinogen Urine 0.2 0.2, 1.0 E.U./dL    Nitrite Urine Negative Negative    Leukocyte Esterase Urine Negative Negative   UA Microscopic with Reflex to Culture   Result Value Ref Range    Bacteria Urine Few (A) None Seen /HPF    RBC Urine 0-2 0-2 /HPF /HPF    WBC Urine 0-5 0-5 /HPF /HPF    Squamous Epithelials Urine Few (A) None Seen /LPF    Mucus Urine Present (A) None Seen /LPF    Narrative    Urine Culture not indicated       If you have any questions or concerns, please call the clinic at the number listed above.       Sincerely,      Jacinto Fernandez MD    Electronically signed

## 2025-01-16 NOTE — PROGRESS NOTES
I have referred the patient to [facility].  Please provide him the contact information to set up an appointment.

## 2025-01-20 ENCOUNTER — TELEPHONE (OUTPATIENT)
Dept: UROLOGY | Facility: CLINIC | Age: 75
End: 2025-01-20
Payer: COMMERCIAL

## 2025-02-05 ENCOUNTER — TRANSFERRED RECORDS (OUTPATIENT)
Dept: HEALTH INFORMATION MANAGEMENT | Facility: CLINIC | Age: 75
End: 2025-02-05
Payer: COMMERCIAL

## 2025-02-11 DIAGNOSIS — E78.5 DYSLIPIDEMIA: ICD-10-CM

## 2025-02-12 RX ORDER — SIMVASTATIN 40 MG
40 TABLET ORAL AT BEDTIME
Qty: 30 TABLET | Refills: 0 | Status: SHIPPED | OUTPATIENT
Start: 2025-02-12

## 2025-03-03 SDOH — HEALTH STABILITY: PHYSICAL HEALTH: ON AVERAGE, HOW MANY MINUTES DO YOU ENGAGE IN EXERCISE AT THIS LEVEL?: 30 MIN

## 2025-03-03 SDOH — HEALTH STABILITY: PHYSICAL HEALTH: ON AVERAGE, HOW MANY DAYS PER WEEK DO YOU ENGAGE IN MODERATE TO STRENUOUS EXERCISE (LIKE A BRISK WALK)?: 2 DAYS

## 2025-03-03 ASSESSMENT — SOCIAL DETERMINANTS OF HEALTH (SDOH): HOW OFTEN DO YOU GET TOGETHER WITH FRIENDS OR RELATIVES?: ONCE A WEEK

## 2025-03-04 ENCOUNTER — OFFICE VISIT (OUTPATIENT)
Dept: FAMILY MEDICINE | Facility: CLINIC | Age: 75
End: 2025-03-04
Payer: COMMERCIAL

## 2025-03-04 VITALS
WEIGHT: 230 LBS | BODY MASS INDEX: 34.07 KG/M2 | HEIGHT: 69 IN | HEART RATE: 72 BPM | TEMPERATURE: 96.5 F | RESPIRATION RATE: 20 BRPM | SYSTOLIC BLOOD PRESSURE: 120 MMHG | DIASTOLIC BLOOD PRESSURE: 74 MMHG

## 2025-03-04 DIAGNOSIS — F17.210 SMOKING 1/2 PACK A DAY OR LESS: ICD-10-CM

## 2025-03-04 DIAGNOSIS — E78.5 DYSLIPIDEMIA: ICD-10-CM

## 2025-03-04 DIAGNOSIS — Z00.00 ENCOUNTER FOR MEDICARE ANNUAL WELLNESS EXAM: Primary | ICD-10-CM

## 2025-03-04 DIAGNOSIS — E66.09 CLASS 1 OBESITY DUE TO EXCESS CALORIES WITH SERIOUS COMORBIDITY AND BODY MASS INDEX (BMI) OF 34.0 TO 34.9 IN ADULT: ICD-10-CM

## 2025-03-04 DIAGNOSIS — E66.811 CLASS 1 OBESITY DUE TO EXCESS CALORIES WITH SERIOUS COMORBIDITY AND BODY MASS INDEX (BMI) OF 34.0 TO 34.9 IN ADULT: ICD-10-CM

## 2025-03-04 DIAGNOSIS — I48.3 TYPICAL ATRIAL FLUTTER (H): ICD-10-CM

## 2025-03-04 DIAGNOSIS — Z23 NEED FOR TDAP VACCINATION: ICD-10-CM

## 2025-03-04 DIAGNOSIS — R31.0 GROSS HEMATURIA: ICD-10-CM

## 2025-03-04 DIAGNOSIS — E11.9 TYPE 2 DIABETES MELLITUS WITHOUT COMPLICATION, WITHOUT LONG-TERM CURRENT USE OF INSULIN (H): ICD-10-CM

## 2025-03-04 PROBLEM — J01.90 ACUTE BACTERIAL RHINOSINUSITIS: Status: RESOLVED | Noted: 2024-12-19 | Resolved: 2025-03-04

## 2025-03-04 PROBLEM — B96.89 ACUTE BACTERIAL RHINOSINUSITIS: Status: RESOLVED | Noted: 2024-12-19 | Resolved: 2025-03-04

## 2025-03-04 RX ORDER — METOPROLOL TARTRATE 25 MG/1
25 TABLET, FILM COATED ORAL 2 TIMES DAILY
Qty: 180 TABLET | Refills: 3 | Status: SHIPPED | OUTPATIENT
Start: 2025-03-04

## 2025-03-04 RX ORDER — SIMVASTATIN 40 MG
40 TABLET ORAL AT BEDTIME
Qty: 90 TABLET | Refills: 3 | Status: SHIPPED | OUTPATIENT
Start: 2025-03-04

## 2025-03-04 NOTE — ASSESSMENT & PLAN NOTE
3/4/2025   Asymptomatic   Anticoagulated with proximal event   Metoprolol 25 mg twice daily     2/29/24  episode with symptoms and controlled rate 4/9/21  [id number]  No symptoms  Rivaroxaban 20 mg daily  EXAM DATE: 05/13/2010    PATIENT NAME: JAMES MCCRACKEN    EXAM: NM NM MYOCARDIAL ECT MULTIPLE STDYS / NM NM MYOCARDIAL PERF W/EJEC FRAC / NM NM OTHER    Patient History:  CHEST PAIN    Study Information:  MYOCARDIAL PERFUSION SCAN WITH GATED SPECT    INDICATION: Chest pain.  TECHNIQUE: 12.0 mCi of Tc-99m Myoview was injected at rest. Imaging of the heart was obtained with SPECT. 25.0 mCi of Tc-99m Myoview was then injected during the performance of an exercise stress test. The patient reached a maximum heart rate of 146 ( 89 % M.P.H.R.). Imaging of the heart repeated with gated SPECT.    FINDINGS: The left ventricular myocardial perfusion appears to be within normal limits. Decreased activity appears to be associated with the inferior wall on the post stress images. No significant change is appreciated on the rest images. No regional wall motion abnormalities identified on the gated SPECT images. The inferior wall appears to move and thicken within normal limits. The left ventricular ejection fraction is calculated to be 60%.    CONCLUSION:    1. Negative study. No myocardial ischemia identified.    2. Diaphragmatic attenuation artifact inferior wall.

## 2025-03-04 NOTE — PATIENT INSTRUCTIONS
Patient Education   Preventive Care Advice   This is general advice given by our system to help you stay healthy. However, your care team may have specific advice just for you. Please talk to your care team about your preventive care needs.  Nutrition  Eat 5 or more servings of fruits and vegetables each day.  Try wheat bread, brown rice and whole grain pasta (instead of white bread, rice, and pasta).  Get enough calcium and vitamin D. Check the label on foods and aim for 100% of the RDA (recommended daily allowance).  Lifestyle  Exercise at least 150 minutes each week  (30 minutes a day, 5 days a week).  Do muscle strengthening activities 2 days a week. These help control your weight and prevent disease.  No smoking.  Wear sunscreen to prevent skin cancer.  Have a dental exam and cleaning every 6 months.  Yearly exams  See your health care team every year to talk about:  Any changes in your health.  Any medicines your care team has prescribed.  Preventive care, family planning, and ways to prevent chronic diseases.  Shots (vaccines)   HPV shots (up to age 26), if you've never had them before.  Hepatitis B shots (up to age 59), if you've never had them before.  COVID-19 shot: Get this shot when it's due.  Flu shot: Get a flu shot every year.  Tetanus shot: Get a tetanus shot every 10 years.  Pneumococcal, hepatitis A, and RSV shots: Ask your care team if you need these based on your risk.  Shingles shot (for age 50 and up)  General health tests  Diabetes screening:  Starting at age 35, Get screened for diabetes at least every 3 years.  If you are younger than age 35, ask your care team if you should be screened for diabetes.  Cholesterol test: At age 39, start having a cholesterol test every 5 years, or more often if advised.  Bone density scan (DEXA): At age 50, ask your care team if you should have this scan for osteoporosis (brittle bones).  Hepatitis C: Get tested at least once in your life.  STIs (sexually  transmitted infections)  Before age 24: Ask your care team if you should be screened for STIs.  After age 24: Get screened for STIs if you're at risk. You are at risk for STIs (including HIV) if:  You are sexually active with more than one person.  You don't use condoms every time.  You or a partner was diagnosed with a sexually transmitted infection.  If you are at risk for HIV, ask about PrEP medicine to prevent HIV.  Get tested for HIV at least once in your life, whether you are at risk for HIV or not.  Cancer screening tests  Cervical cancer screening: If you have a cervix, begin getting regular cervical cancer screening tests starting at age 21.  Breast cancer scan (mammogram): If you've ever had breasts, begin having regular mammograms starting at age 40. This is a scan to check for breast cancer.  Colon cancer screening: It is important to start screening for colon cancer at age 45.  Have a colonoscopy test every 10 years (or more often if you're at risk) Or, ask your provider about stool tests like a FIT test every year or Cologuard test every 3 years.  To learn more about your testing options, visit:   .  For help making a decision, visit:   https://bit.ly/mo93292.  Prostate cancer screening test: If you have a prostate, ask your care team if a prostate cancer screening test (PSA) at age 55 is right for you.  Lung cancer screening: If you are a current or former smoker ages 50 to 80, ask your care team if ongoing lung cancer screenings are right for you.  For informational purposes only. Not to replace the advice of your health care provider. Copyright   2023 [facility] [employer]. All rights reserved. Clinically reviewed by the [facility] Transitions Program. YellowSchedule 630521 - REV 01/24.  Hearing Loss: Care Instructions  Overview     Hearing loss is a sudden or slow decrease in how well you hear. It can range from slight to profound. Permanent hearing loss can occur with aging. It also can  happen when you are exposed long-term to loud noise. Examples include listening to loud music, riding motorcycles, or being around other loud machines.  Hearing loss can affect your work and home life. It can make you feel lonely or depressed. You may feel that you have lost your independence. But hearing aids and other devices can help you hear better and feel connected to others.  Follow-up care is a key part of your treatment and safety. Be sure to make and go to all appointments, and call your doctor if you are having problems. It's also a good idea to know your test results and keep a list of the medicines you take.  How can you care for yourself at home?  Avoid loud noises whenever possible. This helps keep your hearing from getting worse.  Always wear hearing protection around loud noises.  Wear a hearing aid as directed.  A professional can help you pick a hearing aid that will work best for you.  You can also get hearing aids over the counter for mild to moderate hearing loss.  Have hearing tests as your doctor suggests. They can show whether your hearing has changed. Your hearing aid may need to be adjusted.  Use other devices as needed. These may include:  Telephone amplifiers and hearing aids that can connect to a television, stereo, radio, or microphone.  Devices that use lights or vibrations. These alert you to the doorbell, a ringing telephone, or a baby monitor.  Television closed-captioning. This shows the words at the bottom of the screen. Most new TVs can do this.  TTY (text telephone). This lets you type messages back and forth on the telephone instead of talking or listening. These devices are also called TDD. When messages are typed on the keyboard, they are sent over the phone line to a receiving TTY. The message is shown on a monitor.  Use text messaging, social media, and email if it is hard for you to communicate by telephone.  Try to learn a listening technique called speechreading. It is  "not lipreading. You pay attention to people's gestures, expressions, posture, and tone of voice. These clues can help you understand what a person is saying. Face the person you are talking to, and have them face you. Make sure the lighting is good. You need to see the other person's face clearly.  Think about counseling if you need help to adjust to your hearing loss.  When should you call for help?  Watch closely for changes in your health, and be sure to contact your doctor if:    You think your hearing is getting worse.     You have new symptoms, such as dizziness or nausea.   Where can you learn more?  Go to https://www.[employer].net/patiented  Enter R798 in the search box to learn more about \"Hearing Loss: Care Instructions.\"  Current as of: September 27, 2023  Content Version: 14.3    2024 [employer]   Care instructions adapted under license by your healthcare professional. If you have questions about a medical condition or this instruction, always ask your healthcare professional. [employer] disclaims any warranty or liability for your use of this information.       "

## 2025-03-04 NOTE — PROGRESS NOTES
Preventive Care Visit  [facility]  Carlitos Espinosa MD, Internal Medicine  Mar 4, 2025      Assessment & Plan   Problem List Items Addressed This Visit       Atrial flutter (H)     3/4/2025   Asymptomatic   Anticoagulated with proximal event   Metoprolol 25 mg twice daily     2/29/24  episode with symptoms and controlled rate 4/9/21  [id number] 3  No symptoms  Rivaroxaban 20 mg daily  EXAM DATE: 05/13/2010    PATIENT NAME: JAMES MCCRACKEN    EXAM: NM NM MYOCARDIAL ECT MULTIPLE STDYS / NM NM MYOCARDIAL PERF W/EJEC FRAC / NM NM OTHER    Patient History:  CHEST PAIN    Study Information:  MYOCARDIAL PERFUSION SCAN WITH GATED SPECT    INDICATION: Chest pain.  TECHNIQUE: 12.0 mCi of Tc-99m Myoview was injected at rest. Imaging of the heart was obtained with SPECT. 25.0 mCi of Tc-99m Myoview was then injected during the performance of an exercise stress test. The patient reached a maximum heart rate of 146 ( 89 % M.P.H.R.). Imaging of the heart repeated with gated SPECT.    FINDINGS: The left ventricular myocardial perfusion appears to be within normal limits. Decreased activity appears to be associated with the inferior wall on the post stress images. No significant change is appreciated on the rest images. No regional wall motion abnormalities identified on the gated SPECT images. The inferior wall appears to move and thicken within normal limits. The left ventricular ejection fraction is calculated to be 60%.    CONCLUSION:    1. Negative study. No myocardial ischemia identified.    2. Diaphragmatic attenuation artifact inferior wall.          Relevant Medications    rivaroxaban ANTICOAGULANT (XARELTO ANTICOAGULANT) 20 MG TABS tablet    metoprolol tartrate (LOPRESSOR) 25 MG tablet    Class 1 obesity due to excess calories with serious comorbidity and body mass index (BMI) of 34.0 to 34.9 in adult    Smoking 1/2 pack a day or less     Smokes 4-5 cigarettes per day  Not interested in  "stopping  Declines lung cancer screening         Type 2 diabetes mellitus without complication, without long-term current use of insulin (H)     Uncomplicated.    A1c increased to 7.7 and is now willing to start medication   On statin  diabetes is improving/stable/worsening: worsening.  Reminded to get yearly retinal exam. and Diabetes educator referral.  Diabetes will be reassessed in 6 months.         Relevant Orders    Lipid panel reflex to direct LDL Non-fasting    Albumin Random Urine Quantitative with Creat Ratio    Adult Diabetes Education  Referral    Hemoglobin A1c    FOOT EXAM (Completed)    Dyslipidemia     Controlled with simvastatin 40 mg         Relevant Medications    simvastatin (ZOCOR) 40 MG tablet    Gross hematuria     Negative cysto and CT 2/25          Other Visit Diagnoses       Encounter for Medicare annual wellness exam    -  Primary    Need for Tdap vaccination        Relevant Medications    Tdap, tetanus-diptheria-acell pertussis, (BOOSTRIX) 5-2.5-18.5 LF-MCG/0.5 MANOLO injection                    Nicotine/Tobacco Cessation  He reports that he has been smoking cigarettes. He has a 13.5 pack-year smoking history. He has never used smokeless tobacco.  Nicotine/Tobacco Cessation Plan  Information offered: Patient not interested at this time      BMI  Estimated body mass index is 34.46 kg/m  as calculated from the following:    Height as of this encounter: 1.74 m (5' 8.5\").    Weight as of this encounter: 104.3 kg (230 lb).   Weight management plan: Refer to diabetes education for GLP-1    Counseling  Appropriate preventive services were addressed with this patient via screening, questionnaire, or discussion as appropriate for fall prevention, nutrition, physical activity, Tobacco-use cessation, social engagement, weight loss and cognition.  Checklist reviewing preventive services available has been given to the patient.  Reviewed patient's diet, addressing concerns and/or questions. "   He is at risk for lack of exercise and has been provided with information to increase physical activity for the benefit of his well-being.   The patient was instructed to see the dentist every 6 months.   The patient was provided with written information regarding signs of hearing loss.           Marta Redman is a 74 year old, presenting for the following:  Wellness Visit (Pt here for an AWV and fasting labwork) and Diabetes        3/4/2025     2:07 PM   Additional Questions   Roomed by Matty MENDENHALL           HEARING FREQUENCY    Right Ear:      1000 Hz RESPONSE- on Level: 40 db (Conditioning sound)   1000 Hz: RESPONSE- on Level: tone not heard   2000 Hz: RESPONSE- on Level: tone not heard   4000 Hz: RESPONSE- on Level: tone not heard    Left Ear:      4000 Hz: RESPONSE- on Level: tone not heard   2000 Hz: RESPONSE- on Level: tone not heard   1000 Hz: RESPONSE- on Level: tone not heard      Hearing Acuity: REFER    Hearing Assessment: abnormal-- Pt declines any further eval at this time            HPI  Generally in good health.  Nocturia once per night.  No snoring or daytime sleepiness.  Had an episode of gross hematuria and was evaluated by urology with negative workup he reports.  No palpitations, chest pain, dyspnea.  No other complaints.     Diabetes Follow-up    How often are you checking your blood sugar? A few times a month  What time of day are you checking your blood sugars (select all that apply)?  Before meals  Have you had any blood sugars above 200?  No  Have you had any blood sugars below 70?  No  What symptoms do you notice when your blood sugar is low?  Shaky  What concerns do you have today about your diabetes? None   Do you have any of these symptoms? (Select all that apply)  No numbness or tingling in feet.  No redness, sores or blisters on feet.  No complaints of excessive thirst.  No reports of blurry vision.  No significant changes to weight.      BP Readings from Last 2 Encounters:    03/04/25 120/74   12/19/24 112/70     Hemoglobin A1C (%)   Date Value   12/19/2024 7.4 (H)   02/29/2024 7.4 (H)     LDL Cholesterol Calculated (mg/dL)   Date Value   02/29/2024 60   02/17/2023 53           Advance Care Planning  Patient does not have a Health Care Directive: Patient states has Advance Directive and will bring in a copy to clinic.      3/3/2025   General Health   How would you rate your overall physical health? (!) FAIR   Feel stress (tense, anxious, or unable to sleep) Only a little   (!) STRESS CONCERN      3/3/2025   Nutrition   Diet: Regular (no restrictions)         3/3/2025   Exercise   Days per week of moderate/strenous exercise 2 days   Average minutes spent exercising at this level 30 min   (!) EXERCISE CONCERN      3/3/2025   Social Factors   Frequency of gathering with friends or relatives Once a week   Worry food won't last until get money to buy more No   Food not last or not have enough money for food? No   Do you have housing? (Housing is defined as stable permanent housing and does not include staying ouside in a car, in a tent, in an abandoned building, in an overnight shelter, or couch-surfing.) Yes   Are you worried about losing your housing? No   Lack of transportation? No   Unable to get utilities (heat,electricity)? No         3/3/2025   Fall Risk   Fallen 2 or more times in the past year? No   Trouble with walking or balance? No          3/3/2025   Activities of Daily Living- Home Safety   Needs help with the following daily activites None of the above   Safety concerns in the home None of the above         3/3/2025   Dental   Dentist two times every year? (!) NO         3/3/2025   Hearing Screening   Hearing concerns? (!) I FEEL THAT PEOPLE ARE MUMBLING OR NOT SPEAKING CLEARLY.    (!) I NEED TO ASK PEOPLE TO SPEAK UP OR REPEAT THEMSELVES.    (!) IT'S HARD TO FOLLOW A CONVERSATION IN A NOISY RESTAURANT OR CROWDED ROOM.    (!) TROUBLE UNDERSTANDING SOFT OR WHISPERED SPEECH.        Multiple values from one day are sorted in reverse-chronological order         3/3/2025   Driving Risk Screening   Patient/family members have concerns about driving No         3/3/2025   General Alertness/Fatigue Screening   Have you been more tired than usual lately? No         3/3/2025   Urinary Incontinence Screening   Bothered by leaking urine in past 6 months No            Today's PHQ-2 Score:       3/4/2025     1:59 PM   PHQ-2 ( 1999 [employer])   Q1: Little interest or pleasure in doing things 0    Q2: Feeling down, depressed or hopeless 0    PHQ-2 Score 0    Q1: Little interest or pleasure in doing things Not at all   Q2: Feeling down, depressed or hopeless Not at all   PHQ-2 Score 0       Proxy-reported           3/4/2025   Substance Use   If I could quit smoking, I would Neutral   I want to quit somking, worry about health affects Neutral   Willing to make a plan to quit smoking Neutral   Willing to cut down before quitting Neutral     Social History     Tobacco Use    Smoking status: Every Day     Current packs/day: 0.25     Average packs/day: 0.3 packs/day for 54.0 years (13.5 ttl pk-yrs)     Types: Cigarettes    Smokeless tobacco: Never   Vaping Use    Vaping status: Never Used   Substance Use Topics    Alcohol use: Yes     Alcohol/week: 3.0 standard drinks of alcohol     Types: 3 Standard drinks or equivalent per week       ASCVD Risk   The ASCVD Risk score (Estuardo DK, et al., 2019) failed to calculate for the following reasons:    The valid total cholesterol range is 130 to 320 mg/dL            Reviewed and updated as needed this visit by Provider                    Past Surgical History:   Procedure Laterality Date    CATARACT EXTRACTION Bilateral 2018    CHOLECYSTECTOMY      COLONOSCOPY W/ POLYPECTOMY  05/23/2018    divertiulosis, 1 hyperplastic polyp    COLONOSCOPY W/ POLYPECTOMY  04/28/2023    3 TA, diverticulosis, repeat 5 years    CYSTOSCOPY  02/11/2025    normal    DIABETIC (DILATED)  "EYE EXAM Bilateral 01/16/2025    No Diabetic retinopathy per Dr Ivan Griffiths     Current providers sharing in care for this patient include:  Patient Care Team:  Carlitos Espinosa MD as PCP - General (Internal Medicine)  Matty Najera OD as PCP - Ophthalmology (Ophthalmology)  Carlitos Espinosa MD as Assigned PCP    The following health maintenance items are reviewed in Epic and correct as of today:  Health Maintenance   Topic Date Due    DTAP/TDAP/TD IMMUNIZATION (1 - Tdap) Never done    RSV VACCINE (1 - Risk 60-74 years 1-dose series) Never done    ZOSTER IMMUNIZATION (2 of 2) 12/28/2020    LIPID  02/28/2025    MICROALBUMIN  02/28/2025    NICOTINE/TOBACCO CESSATION COUNSELING Q 1 YR  02/28/2025    A1C  03/19/2025    COVID-19 Vaccine (8 - 2024-25 season) 07/16/2025    BMP  12/19/2025    ANNUAL REVIEW OF HM ORDERS  12/19/2025    EYE EXAM  01/16/2026    MEDICARE ANNUAL WELLNESS VISIT  03/04/2026    DIABETIC FOOT EXAM  03/04/2026    FALL RISK ASSESSMENT  03/04/2026    COLORECTAL CANCER SCREENING  04/28/2028    ADVANCE CARE PLANNING  03/04/2030    HEPATITIS C SCREENING  Completed    PHQ-2 (once per calendar year)  Completed    INFLUENZA VACCINE  Completed    Pneumococcal Vaccine: 50+ Years  Completed    AORTIC ANEURYSM SCREENING (SYSTEM ASSIGNED)  Completed    HPV IMMUNIZATION  Aged Out    MENINGITIS IMMUNIZATION  Aged Out    LUNG CANCER SCREENING  Discontinued         Review of Systems  Constitutional, HEENT, cardiovascular, pulmonary, GI, , musculoskeletal, neuro, skin, endocrine and psych systems are negative, except as otherwise noted.     Objective    Exam  /74   Pulse 72   Temp (!) 96.5  F (35.8  C) (Tympanic)   Resp 20   Ht 1.74 m (5' 8.5\")   Wt 104.3 kg (230 lb)   BMI 34.46 kg/m     Estimated body mass index is 34.46 kg/m  as calculated from the following:    Height as of this encounter: 1.74 m (5' 8.5\").    Weight as of this encounter: 104.3 kg (230 lb).    Physical Exam  Overweight man in no " distress  Eyes appear normal  HEENT exam reveals crowded oropharynx.  Mallampati 4  Neck supple no adenopathy or thyromegaly  Lungs clear  Cardiac exam regular, no murmur, no edema, normal carotid pulsations  Abdomen soft, nondistended, nontender  Alert, oriented, speech and cognition intact, cranial nerves normal, strength and gait normal  Normal mood and affect  Feet in good condition with intact sensation to monofilament and vibration as well as intact pulses        3/4/2025   Mini Cog   Clock Draw Score 2 Normal   3 Item Recall 2 objects recalled   Mini Cog Total Score 4              Signed Electronically by: Carlitos Espinosa MD

## 2025-03-04 NOTE — ASSESSMENT & PLAN NOTE
Uncomplicated.    A1c increased to 7.7 and is now willing to start medication   On statin  diabetes is improving/stable/worsening: worsening.  Reminded to get yearly retinal exam. and Diabetes educator referral.  Diabetes will be reassessed in 6 months.

## 2025-03-05 LAB
CHOLEST SERPL-MCNC: 101 MG/DL
CREAT UR-MCNC: 45 MG/DL
FASTING STATUS PATIENT QL REPORTED: ABNORMAL
HDLC SERPL-MCNC: 37 MG/DL
LDLC SERPL CALC-MCNC: 49 MG/DL
MICROALBUMIN UR-MCNC: <12 MG/L
MICROALBUMIN/CREAT UR: NORMAL MG/G{CREAT}
NONHDLC SERPL-MCNC: 64 MG/DL
TRIGL SERPL-MCNC: 75 MG/DL

## 2025-03-06 ENCOUNTER — OFFICE VISIT (OUTPATIENT)
Dept: EDUCATION SERVICES | Facility: CLINIC | Age: 75
End: 2025-03-06
Attending: INTERNAL MEDICINE
Payer: COMMERCIAL

## 2025-03-06 DIAGNOSIS — E11.9 TYPE 2 DIABETES MELLITUS WITHOUT COMPLICATION, WITHOUT LONG-TERM CURRENT USE OF INSULIN (H): Primary | ICD-10-CM

## 2025-03-06 PROCEDURE — G0108 DIAB MANAGE TRN  PER INDIV: HCPCS | Performed by: DIETITIAN, REGISTERED

## 2025-03-06 NOTE — PATIENT INSTRUCTIONS
Ozempic   Weekly injection   First pen   4 weeks 0.25mg  2 weeks 0.50mg    Will need a new pen    4 weeks 0.50 mg    New Pen delivers only 1.0 mg you will get 4 weeks out of each pen  Goal dose 1.0 mg     Potential to increase to 2.0 mg in the future.          Goals:  Practice healthy stress management and mindful eating - think are you physically hungry or are you bored, stressed, emotional etc, make of list of things to do besides eat.    Try to get good quality sleep with a goal of 7-8 hours per night.  Stay physically active daily.  Recommend working up to a total of 30 minutes on 5 days/ week.  Recommend a fitness tracker.     Eat in a healthy way- eliminate trans fats, limit saturated fats and added sugars; follow the plate method - picture above.  Keep a food record (MyFitnessPal, Loser[employer]).    A meal is 3 or more food groups; make it colorful for better nutrition.    Total Carbohydrates (in grams) = Breakfast  30    Lunch  30    Supper  30    If desired snacks 15

## 2025-03-06 NOTE — LETTER
3/6/2025         RE: Iker Escobar  [address] [facility] [address]        Dear Colleague,    Thank you for referring your patient, Iker Escobar, to the [facility]. Please see a copy of my visit note below.    Diabetes Self-Management Education & Support    Presents for: Type II Diabetes, Obesity Body mass index is 35.08 kg/m .      Type of Service: In Person Visit      Assessment  Patient's A1c gradually increasing.  Patient has been trying to follow a carb controlled and active lifestyle since diagnosis and has been able to maintain A1c typically under 7, since 2024 at 7.4% patient has decided to move forward with medication and would like to start Ozempic.  Increasing A1c likely related to natural progression of diabetes.      Patient's most recent   Lab Results   Component Value Date    A1C 7.4 12/19/2024    A1C 7.4 02/29/2024    A1C 6.8 02/17/2023    A1C 6.6 03/31/2022    A1C 6.3 04/06/2021         is not meeting goal of <7.0    Diabetes knowledge and skills assessment:   Patient is knowledgeable in diabetes management concepts related to: Updated and education today    Based on learning assessment above, most appropriate setting for further diabetes education would be: Individual setting.    Care Plan and Education Provided:  Healthy Eating: Carbohydrate Counting, Heart healthy diet, and Plate planning method,     Being Active: Amount recommended (150 minutes moderate or 75 minutes vigorous activity and 2-3 days strength training per week) and Relationship of activity to glucose,     Monitoring: Individual glucose targets and Log and interpret results,     Taking Medication: Administering and storing injectable diabetes medications, Proper site selection and rotation for injections, Side effects of prescribed medication(s), and GLP-1/GIP Instruction - Ozempic administration technique taught today. Patient verbalized understanding and was able to perform an accurate  return demonstration of administration technique. Side effects were discussed, if patient has any abdominal pain, with or without nausea and/or vomiting, stop medication, call provider, clinic or go to the emergency room.,     Problem Solving: High glucose - causes, signs/symptoms, treatment and prevention,     Reducing Risks: Goal for A1c, how it relates to glucose and how often to check, and     Healthy Coping: Benefits of making appropriate lifestyle changes    Patient verbalized understanding of diabetes self-management education concepts discussed, opportunities for ongoing education and support, and recommendations provided today.    Plan    Ozempic   Weekly injection   First pen   4 weeks 0.25mg  2 weeks 0.50mg    Will need a new pen    4 weeks 0.50 mg    New Pen delivers only 1.0 mg you will get 4 weeks out of each pen  Goal dose 1.0 mg     Topics to cover at upcoming visits: Monitoring, Taking Medication, Problem Solving, Reducing Risks, and Healthy Coping    Follow-up:  Upcoming Diabetes Ed Appointments     No appointments to display        See Care Plan for co-developed, patient-state behavior change goals.    Education Materials Provided:  Goals for your diabetes care, semaglutide      Subjective/Objective  Feroz is an 74 year old year old, presenting for the following diabetes education related to: Presents for: Follow-up  Accompanied by: Self  Diabetes education in the past 24mo: (Patient-Rptd) No  Diabetes type: (Patient-Rptd) Type 2  Date of diagnosis: 4/2018 - when last seen by educator  Disease course: (Patient-Rptd) Stable  How confident are you filling out medical forms by yourself:: (Patient-Rptd) Quite a bit  Transportation concerns: No  Difficulty affording diabetes medication?: No  Difficulty affording diabetes testing supplies?: No  Other concerns:: None  Cultural Influences/Ethnic Background:  Not  or     Diabetes Symptoms & Complications:  Diabetes Related Symptoms:  "(Patient-Rptd) None  Weight trend: (Patient-Rptd) Stable  Symptom course: (Patient-Rptd) Stable  Disease course: (Patient-Rptd) Stable  Complications assessed today?: Yes  Autonomic neuropathy: No  CVA: No  Heart disease: No  Nephropathy: No  Peripheral neuropathy: No  Peripheral Vascular Disease: No  Retinopathy: No    Patient Problem List and Family Medical History reviewed for relevant medical history, current medical status, and diabetes risk factors.    Vitals:  Ht 1.74 m (5' 8.5\")   Wt 106.2 kg (234 lb 1.6 oz)   BMI 35.08 kg/m    Estimated body mass index is 35.08 kg/m  as calculated from the following:    Height as of this encounter: 1.74 m (5' 8.5\").    Weight as of this encounter: 106.2 kg (234 lb 1.6 oz).   Last 3 BP:   BP Readings from Last 3 Encounters:   03/04/25 120/74   12/19/24 112/70   02/29/24 124/74       History   Smoking Status     Every Day     Types: Cigarettes   Smokeless Tobacco     Never       Labs:  Lab Results   Component Value Date    A1C 7.4 12/19/2024     Lab Results   Component Value Date     12/19/2024     03/31/2022     Lab Results   Component Value Date    LDL 49 03/04/2025     Direct Measure HDL   Date Value Ref Range Status   03/04/2025 37 (L) >=40 mg/dL Final   ]  GFR Estimate   Date Value Ref Range Status   12/19/2024 71 >60 mL/min/1.73m2 Final     Comment:     eGFR calculated using 2021 CKD-EPI equation.     No results found for: \"GFRESTBLACK\"  Lab Results   Component Value Date    CR 1.11 12/19/2024     No results found for: \"MICROALBUMIN\"    Healthy Eating:  Healthy Eating Assessed Today: Yes  Cultural/Christianity diet restrictions?: (Patient-Rptd) No  Do you have any food allergies or intolerances?: No  Meal planning/habits: Heart healthy (carb controlled)  Who cooks/prepares meals for you?: Self, Spouse  Who purchases food in  your home?: Self, Spouse  How many times a week on average do you eat food made away from home (restaurant/take-out)?: (Patient-Rptd) " 2  Meals include: (Patient-Rptd) Breakfast, Lunch, Dinner, Evening Snack  Breakfast: cereal life with 2% milk - no change many years - since 2018  Lunch: whatever wife fixes stirfry with chicken  Dinner: hamburger elana no but, peas, corn, carrots  Snacks: nuts  Beverages: (Patient-Rptd) Water, Coffee, Milk  Has patient met with a dietitian in the past?: Yes (2018)    Being Active:  Being Active Assessed Today: Yes  Exercise:: Currently not exercising (not much in the winter)  Barrier to exercise: (Patient-Rptd) None    Monitoring:  Monitoring Assessed Today: Yes  Did patient bring glucose meter to appointment? : No  Blood Glucose Meter: (Patient-Rptd) Accu-chek  Times checking blood sugar at home (number): (Patient-Rptd) 1  Times checking blood sugar at home (per): (Patient-Rptd) Week  Blood glucose trend: No change (gradual a1c increasing)      Taking Medications:  Diabetes Medication(s)       Incretin Mimetic Agents       semaglutide (OZEMPIC) 2 MG/3ML pen Inject 0.25 mg subcutaneously every 7 days for 28 days, THEN 0.5 mg every 7 days for 14 days.     semaglutide (OZEMPIC) 2 MG/3ML pen Inject 0.5 mg subcutaneously every 7 days for 28 days.     Semaglutide, 1 MG/DOSE, (OZEMPIC) 4 MG/3ML pen Inject 1 mg subcutaneously every 7 days.          Taking Medication Assessed Today: Yes  Current Treatments: Diet    Problem Solving:  Problem Solving Assessed Today: Yes  Is the patient at risk for hypoglycemia?: No  Is the patient at risk for DKA?: No  Does patient have severe weather/disaster plan for diabetes management?: Yes  Does patient have sick day plan for diabetes management?: Yes        Reducing Risks:  Reducing Risks Assessed Today: No  Has dilated eye exam at least once a year?: (Patient-Rptd) Yes  Sees dentist every 6 months?: (Patient-Rptd) No  Feet checked by healthcare provider in the last year?: (Patient-Rptd) Yes    Healthy Coping:  Healthy Coping Assessed Today: Yes  Emotional response to diabetes:  Acceptance, Ready to learn  Informal Support system:: (Patient-Rptd) Children, Family, Friends, Spouse  Stage of change: ACTION (Actively working towards change)  Support resources: In-person Offerings    Trish Padilla RD  Time Spent: 60 minutes  Encounter Type: Individual    Any diabetes medication dose changes were made via the CDCES Standing Orders under the patient's referring provider.

## 2025-03-08 VITALS — BODY MASS INDEX: 34.67 KG/M2 | HEIGHT: 69 IN | WEIGHT: 234.1 LBS

## 2025-03-08 NOTE — PROGRESS NOTES
Diabetes Self-Management Education & Support    Presents for: Type II Diabetes, Obesity Body mass index is 35.08 kg/m .      Type of Service: In Person Visit      Assessment  Patient's A1c gradually increasing.  Patient has been trying to follow a carb controlled and active lifestyle since diagnosis and has been able to maintain A1c typically under 7, since 2024 at 7.4% patient has decided to move forward with medication and would like to start Ozempic.  Increasing A1c likely related to natural progression of diabetes.      Patient's most recent   Lab Results   Component Value Date    A1C 7.4 12/19/2024    A1C 7.4 02/29/2024    A1C 6.8 02/17/2023    A1C 6.6 03/31/2022    A1C 6.3 04/06/2021         is not meeting goal of <7.0    Diabetes knowledge and skills assessment:   Patient is knowledgeable in diabetes management concepts related to: Updated and education today    Based on learning assessment above, most appropriate setting for further diabetes education would be: Individual setting.    Care Plan and Education Provided:  Healthy Eating: Carbohydrate Counting, Heart healthy diet, and Plate planning method,     Being Active: Amount recommended (150 minutes moderate or 75 minutes vigorous activity and 2-3 days strength training per week) and Relationship of activity to glucose,     Monitoring: Individual glucose targets and Log and interpret results,     Taking Medication: Administering and storing injectable diabetes medications, Proper site selection and rotation for injections, Side effects of prescribed medication(s), and GLP-1/GIP Instruction - Ozempic administration technique taught today. Patient verbalized understanding and was able to perform an accurate return demonstration of administration technique. Side effects were discussed, if patient has any abdominal pain, with or without nausea and/or vomiting, stop medication, call provider, clinic or go to the emergency room.,     Problem Solving: High  glucose - causes, signs/symptoms, treatment and prevention,     Reducing Risks: Goal for A1c, how it relates to glucose and how often to check, and     Healthy Coping: Benefits of making appropriate lifestyle changes    Patient verbalized understanding of diabetes self-management education concepts discussed, opportunities for ongoing education and support, and recommendations provided today.    Plan    Ozempic   Weekly injection   First pen   4 weeks 0.25mg  2 weeks 0.50mg    Will need a new pen    4 weeks 0.50 mg    New Pen delivers only 1.0 mg you will get 4 weeks out of each pen  Goal dose 1.0 mg     Topics to cover at upcoming visits: Monitoring, Taking Medication, Problem Solving, Reducing Risks, and Healthy Coping    Follow-up:  Upcoming Diabetes Ed Appointments     No appointments to display        See Care Plan for co-developed, patient-state behavior change goals.    Education Materials Provided:  Goals for your diabetes care, semaglutide      Subjective/Objective  Feroz is an 74 year old year old, presenting for the following diabetes education related to: Presents for: Follow-up  Accompanied by: Self  Diabetes education in the past 24mo: (Patient-Rptd) No  Diabetes type: (Patient-Rptd) Type 2  Date of diagnosis: 4/2018 - when last seen by educator  Disease course: (Patient-Rptd) Stable  How confident are you filling out medical forms by yourself:: (Patient-Rptd) Quite a bit  Transportation concerns: No  Difficulty affording diabetes medication?: No  Difficulty affording diabetes testing supplies?: No  Other concerns:: None  Cultural Influences/Ethnic Background:  Not  or     Diabetes Symptoms & Complications:  Diabetes Related Symptoms: (Patient-Rptd) None  Weight trend: (Patient-Rptd) Stable  Symptom course: (Patient-Rptd) Stable  Disease course: (Patient-Rptd) Stable  Complications assessed today?: Yes  Autonomic neuropathy: No  CVA: No  Heart disease: No  Nephropathy: No  Peripheral  "neuropathy: No  Peripheral Vascular Disease: No  Retinopathy: No    Patient Problem List and Family Medical History reviewed for relevant medical history, current medical status, and diabetes risk factors.    Vitals:  Ht 1.74 m (5' 8.5\")   Wt 106.2 kg (234 lb 1.6 oz)   BMI 35.08 kg/m    Estimated body mass index is 35.08 kg/m  as calculated from the following:    Height as of this encounter: 1.74 m (5' 8.5\").    Weight as of this encounter: 106.2 kg (234 lb 1.6 oz).   Last 3 BP:   BP Readings from Last 3 Encounters:   03/04/25 120/74   12/19/24 112/70   02/29/24 124/74       History   Smoking Status    Every Day    Types: Cigarettes   Smokeless Tobacco    Never       Labs:  Lab Results   Component Value Date    A1C 7.4 12/19/2024     Lab Results   Component Value Date     12/19/2024     03/31/2022     Lab Results   Component Value Date    LDL 49 03/04/2025     Direct Measure HDL   Date Value Ref Range Status   03/04/2025 37 (L) >=40 mg/dL Final   ]  GFR Estimate   Date Value Ref Range Status   12/19/2024 71 >60 mL/min/1.73m2 Final     Comment:     eGFR calculated using 2021 CKD-EPI equation.     No results found for: \"GFRESTBLACK\"  Lab Results   Component Value Date    CR 1.11 12/19/2024     No results found for: \"MICROALBUMIN\"    Healthy Eating:  Healthy Eating Assessed Today: Yes  Cultural/Anabaptist diet restrictions?: (Patient-Rptd) No  Do you have any food allergies or intolerances?: No  Meal planning/habits: Heart healthy (carb controlled)  Who cooks/prepares meals for you?: Self, Spouse  Who purchases food in  your home?: Self, Spouse  How many times a week on average do you eat food made away from home (restaurant/take-out)?: (Patient-Rptd) 2  Meals include: (Patient-Rptd) Breakfast, Lunch, Dinner, Evening Snack  Breakfast: cereal life with 2% milk - no change many years - since 2018  Lunch: whatever wife fixes stirfry with chicken  Dinner: hamburger elana no but, peas, corn, carrots  Snacks: " nuts  Beverages: (Patient-Rptd) Water, Coffee, Milk  Has patient met with a dietitian in the past?: Yes (2018)    Being Active:  Being Active Assessed Today: Yes  Exercise:: Currently not exercising (not much in the winter)  Barrier to exercise: (Patient-Rptd) None    Monitoring:  Monitoring Assessed Today: Yes  Did patient bring glucose meter to appointment? : No  Blood Glucose Meter: (Patient-Rptd) Accu-chek  Times checking blood sugar at home (number): (Patient-Rptd) 1  Times checking blood sugar at home (per): (Patient-Rptd) Week  Blood glucose trend: No change (gradual a1c increasing)      Taking Medications:  Diabetes Medication(s)       Incretin Mimetic Agents       semaglutide (OZEMPIC) 2 MG/3ML pen Inject 0.25 mg subcutaneously every 7 days for 28 days, THEN 0.5 mg every 7 days for 14 days.     semaglutide (OZEMPIC) 2 MG/3ML pen Inject 0.5 mg subcutaneously every 7 days for 28 days.     Semaglutide, 1 MG/DOSE, (OZEMPIC) 4 MG/3ML pen Inject 1 mg subcutaneously every 7 days.          Taking Medication Assessed Today: Yes  Current Treatments: Diet    Problem Solving:  Problem Solving Assessed Today: Yes  Is the patient at risk for hypoglycemia?: No  Is the patient at risk for DKA?: No  Does patient have severe weather/disaster plan for diabetes management?: Yes  Does patient have sick day plan for diabetes management?: Yes        Reducing Risks:  Reducing Risks Assessed Today: No  Has dilated eye exam at least once a year?: (Patient-Rptd) Yes  Sees dentist every 6 months?: (Patient-Rptd) No  Feet checked by healthcare provider in the last year?: (Patient-Rptd) Yes    Healthy Coping:  Healthy Coping Assessed Today: Yes  Emotional response to diabetes: Acceptance, Ready to learn  Informal Support system:: (Patient-Rptd) Children, Family, Friends, Spouse  Stage of change: ACTION (Actively working towards change)  Support resources: In-person Offerings    Trish Padilla RD  Time Spent: 60 minutes  Encounter Type:  Individual    Any diabetes medication dose changes were made via the [facility] Standing Orders under the patient's referring provider.

## 2025-03-10 ENCOUNTER — TELEPHONE (OUTPATIENT)
Dept: FAMILY MEDICINE | Facility: CLINIC | Age: 75
End: 2025-03-10
Payer: COMMERCIAL

## 2025-03-10 NOTE — TELEPHONE ENCOUNTER
Clinic receives [facility] ozempic 0.25 or 0.5ml and ozempic 1mg/dose.  Pt Dx is Dm type 2 E11.9 and class 1 obesity E66.811    Key for 0.25 or 0.5:  [id number]  Key for 1mg          : [id number]    Matty Shannon CMA

## 2025-03-12 NOTE — TELEPHONE ENCOUNTER
Prior Authorization Retail Medication Request    Medication/Dose: semaglutide (OZEMPIC) 2 MG/3ML pen  Diagnosis and ICD code (if different than what is on RX):    New/renewal/insurance change PA/secondary ins. PA:  Previously Tried and Failed:    Rationale:      Insurance   Primary: MEDICARE FOR HB SUPPLEMENT   Insurance ID:  [id number]     Secondary (if applicable):MEDICA   Insurance ID:  [id number]     Pharmacy Information (if different than what is on RX)  Name:    Phone:    Fax:    Clinic Information  Preferred routing pool for dept communication: [facility] Pool

## 2025-03-12 NOTE — TELEPHONE ENCOUNTER
PA Initiation    Medication: OZEMPIC (0.25 OR 0.5 MG/DOSE) 2 MG/3ML SC SOPN  Insurance Company: [employer] Part D - Phone [phone] Fax [phone]  Pharmacy Filling the Rx: [facility] [address] [place] [address]  Filling Pharmacy Phone: [phone]  Filling Pharmacy Fax:    Start Date: 3/12/2025  Retail Pharmacy Prior Authorization Team   Phone: [phone]

## 2025-03-13 NOTE — TELEPHONE ENCOUNTER
Prior Authorization Approval    Medication: OZEMPIC (0.25 OR 0.5 MG/DOSE) 2 MG/3ML SC SOPN  Authorization Effective Date: 3/12/2025  Authorization Expiration Date: 12/31/2025  Approved Dose/Quantity:   Reference #:     Insurance Company: [employer] Part D - Phone [phone] Fax [phone]  Expected CoPay: $    CoPay Card Available:      Financial Assistance Needed: No  Which Pharmacy is filling the prescription: [facility] [address]  Pharmacy Notified: Yes  Patient Notified: Instructed pharmacy to notify patient once order is ready.

## 2025-04-06 ENCOUNTER — HEALTH MAINTENANCE LETTER (OUTPATIENT)
Age: 75
End: 2025-04-06

## 2025-04-16 DIAGNOSIS — E11.9 TYPE 2 DIABETES MELLITUS WITHOUT COMPLICATION, WITHOUT LONG-TERM CURRENT USE OF INSULIN (H): ICD-10-CM

## 2025-04-17 RX ORDER — SEMAGLUTIDE 0.68 MG/ML
INJECTION, SOLUTION SUBCUTANEOUS
Qty: 3 ML | Refills: 7 | OUTPATIENT
Start: 2025-04-17

## 2025-04-17 NOTE — TELEPHONE ENCOUNTER
Clinic RN: Please investigate patient's chart or contact patient if the information cannot be found because the last prescription was a taper dose (not in RN protocol). We need to know what dose patient is taking. Determine the current dose, then route to provider and ask provider to update the SIG and approve or deny the prescription.

## 2025-04-17 NOTE — TELEPHONE ENCOUNTER
Call with pt, who states he has taken the first dose of Ozempic 0.25mg.   Goal dose is ozempic 1mg.

## 2025-06-12 ENCOUNTER — OFFICE VISIT (OUTPATIENT)
Dept: EDUCATION SERVICES | Facility: CLINIC | Age: 75
End: 2025-06-12
Payer: COMMERCIAL

## 2025-06-12 ENCOUNTER — RESULTS FOLLOW-UP (OUTPATIENT)
Dept: FAMILY MEDICINE | Facility: CLINIC | Age: 75
End: 2025-06-12

## 2025-06-12 VITALS — HEIGHT: 69 IN | WEIGHT: 218.4 LBS | BODY MASS INDEX: 32.35 KG/M2

## 2025-06-12 DIAGNOSIS — E66.09 CLASS 1 OBESITY DUE TO EXCESS CALORIES WITH SERIOUS COMORBIDITY AND BODY MASS INDEX (BMI) OF 34.0 TO 34.9 IN ADULT: ICD-10-CM

## 2025-06-12 DIAGNOSIS — E11.9 TYPE 2 DIABETES MELLITUS WITHOUT COMPLICATION, WITHOUT LONG-TERM CURRENT USE OF INSULIN (H): Primary | ICD-10-CM

## 2025-06-12 DIAGNOSIS — E66.811 CLASS 1 OBESITY DUE TO EXCESS CALORIES WITH SERIOUS COMORBIDITY AND BODY MASS INDEX (BMI) OF 34.0 TO 34.9 IN ADULT: ICD-10-CM

## 2025-06-12 LAB
EST. AVERAGE GLUCOSE BLD GHB EST-MCNC: 146 MG/DL
HBA1C MFR BLD: 6.7 % (ref 0–5.6)

## 2025-06-12 NOTE — PROGRESS NOTES
Diabetes Self-Management Education & Support    Presents for: Follow-up type 2 diabetes obesity class I Body mass index is 32.72 kg/m .    Type of Service: In Person Visit      Assessment  3/6/2025 patient's A1c gradually increasing.  Patient has been trying to follow a carb controlled and active lifestyle since diagnosis and has been able to maintain A1c typically under 7, since 2024 at 7.4% patient has decided to move forward with medication and would like to start Ozempic.  Increasing A1c likely related to natural progression of diabetes.      6/12/2025 Patient's A1c taken today showing improvement at 6.7%.  Patient is tolerating 1 mg of Ozempic well.  No changes in diabetes plan.  Patient will continue to follow dietary and lifestyle interventions with carbohydrate controlled meal plan and trying to stay active.     Patient's most recent   Lab Results   Component Value Date    A1C 6.7 06/12/2025     is meeting goal of <7.0    Diabetes knowledge and skills assessment:   Patient is knowledgeable in diabetes management concepts related to: Healthy Eating, Being Active, Monitoring, Taking Medication, Problem Solving, Reducing Risks, and Healthy Coping    Based on learning assessment above, most appropriate setting for further diabetes education would be: Individual setting.    Care Plan and Education Provided:  Healthy Eating: Eating out, Heart healthy diet, and Weight Management,     Being Active: Amount recommended (150 minutes moderate or 75 minutes vigorous activity and 2-3 days strength training per week) and Relationship of activity to glucose,     Monitoring: Frequency of monitoring and Individual glucose targets,     Taking Medication: Action of prescribed medication(s), Administering and storing injectable diabetes medications, and Proper site selection and rotation for injections,     Problem Solving: When to call a health care provider,     Reducing Risks: Complications of diabetes, Preventing  cardiovascular disease, including blood pressure goals, lipid goals, recommendations for cardioprotective medications, statins, and aspirin, and A1C - goals, relating to blood glucose levels, how often to check, and     Healthy Coping: Identifying helpful resources    Patient verbalized understanding of diabetes self-management education concepts discussed, opportunities for ongoing education and support, and recommendations provided today.    Plan  Continue Ozempic 1 mg weekly    Blood Glucose testing   Test on 3 days per week (before and 2 hours after one meal)  Before eating goal 80 - 130mg/dL  2 hours after goal 80 - 150mg/dL     Topics to cover at upcoming visits: Any area as needed for ongoing diabetes self-management education and support/ motivational counseling.    See Care Plan for co-developed, patient-state behavior change goals.    Education Materials Provided:  Goals for diabetes care      Subjective/Objective  Feroz is an 74 year old, presenting for the following diabetes education related to: Follow-up  Accompanied by: Self  Diabetes education in the past 24mo: Yes  Diabetes type: (Patient-Rptd) Type 2  Date of diagnosis: 4/2018  Disease course: Improving  How confident are you filling out medical forms by yourself:: (Patient-Rptd) Somewhat  Transportation concerns: No  Difficulty affording diabetes medication?: No  Difficulty affording diabetes testing supplies?: No  Other concerns: None  Cultural Influences/Ethnic Background:  Not  or     Diabetes Symptoms & Complications:  Diabetes Related Symptoms: (Patient-Rptd) Fatigue  Weight trend: (Patient-Rptd) Stable  Symptom course: (Patient-Rptd) Stable  Disease course: Improving  Complications assessed today?: Yes  Autonomic neuropathy: No  CVA: No  Heart disease: No  Nephropathy: No  Peripheral neuropathy: No  Peripheral Vascular Disease: No  Retinopathy: No    Patient Problem List and Family Medical History reviewed for relevant medical  "history, current medical status, and diabetes risk factors.    Vitals:  Ht 1.74 m (5' 8.5\")   Wt 99.1 kg (218 lb 6.4 oz)   BMI 32.72 kg/m    Estimated body mass index is 32.72 kg/m  as calculated from the following:    Height as of this encounter: 1.74 m (5' 8.5\").    Weight as of this encounter: 99.1 kg (218 lb 6.4 oz).   Last 3 BP:   BP Readings from Last 3 Encounters:   03/04/25 120/74   12/19/24 112/70   02/29/24 124/74       History   Smoking Status    Every Day    Types: Cigarettes   Smokeless Tobacco    Never       Labs:  Lab Results   Component Value Date    A1C 6.7 06/12/2025     Lab Results   Component Value Date     12/19/2024     03/31/2022     Lab Results   Component Value Date    LDL 49 03/04/2025     Direct Measure HDL   Date Value Ref Range Status   03/04/2025 37 (L) >=40 mg/dL Final     GFR Estimate   Date Value Ref Range Status   12/19/2024 71 >60 mL/min/1.73m2 Final     Comment:     eGFR calculated using 2021 CKD-EPI equation.     No results found for: \"GFRESTBLACK\"  Lab Results   Component Value Date    CR 1.11 12/19/2024     Lab Results   Component Value Date    MICROL <12.0 03/04/2025    UMALCR  03/04/2025      Comment:      Unable to calculate, urine albumin and/or urine creatinine is outside detectable limits.  Microalbuminuria is defined as an albumin:creatinine ratio of 17 to 299 for males and 25 to 299 for females. A ratio of albumin:creatinine of 300 or higher is indicative of overt proteinuria.  Due to biologic variability, positive results should be confirmed by a second, first-morning random or 24-hour timed urine specimen. If there is discrepancy, a third specimen is recommended. When 2 out of 3 results are in the microalbuminuria range, this is evidence for incipient nephropathy and warrants increased efforts at glucose control, blood pressure control, and institution of therapy with an angiotensin-converting-enzyme (ACE) inhibitor (if the patient can tolerate it).   "    RR 45.0 03/04/2025 6/11/2025   Healthy Eating   Healthy Eating Assessed Today Yes   Cultural/Adventist diet restrictions? No   Do you have any food allergies or intolerances? No   Meal planning/habits Heart healthy       carb controlled   Who cooks/prepares meals for you? Self;Spouse   Who purchases food in  your home? Self;Spouse   How many times a week on average do you eat food made away from home (restaurant/take-out)? 2   Meals include Breakfast;Lunch;Dinner;Evening Snack   Breakfast cereal life with 2% milk - no change many years - since 2018   Lunch whatever wife fixes stirfry with chicken   Dinner hamburger elana and salad no but, peas, corn, carrots   Snacks nuts   Beverages Water;Coffee;Milk;Juice   Has patient met with a dietitian in the past? Yes       2018 6/11/2025   Being Active   Being Active Assessed Today Yes   Exercise: Currently not exercising       not much in the winter   Barrier to exercise None         6/11/2025   Monitoring   Monitoring Assessed Today Yes   Did patient bring glucose meter to appointment?  No   Blood Glucose Meter Accu-chek   Times checking blood sugar at home (number) 1   Times checking blood sugar at home (per) Week   Blood glucose trend No change       gradual a1c increasing   Patient reports last BG check 87 mg/dL  Diabetes Medication(s)       Incretin Mimetic Agents       Semaglutide, 1 MG/DOSE, (OZEMPIC) 4 MG/3ML pen Inject 1 mg subcutaneously every 7 days.              6/11/2025   Taking Medications   Taking Medication Assessed Today Yes   Current Treatments Non-insulin Injectables   Problems taking diabetes medications regularly? No   Diabetes medication side effects? --        taste different, little decreased appetite         6/11/2025   Problem Solving   Problem Solving Assessed Today Yes   Is the patient at risk for hypoglycemia? No   Is the patient at risk for DKA? No   Does patient have severe weather/disaster plan for diabetes management?  Yes   Does patient have sick day plan for diabetes management? Yes           6/11/2025   Reducing Risks   Reducing Risks Assessed Today No   Diabetes Risks Age over 45 years   CAD Risks Family history;Diabetes Mellitus;Dyslipidemia;Obesity;Male sex   Has dilated eye exam at least once a year? Yes   Sees dentist every 6 months? No   Feet checked by healthcare provider in the last year? Yes         6/11/2025   Healthy Coping: Diabetes Distress Assessment   Healthy Coping Assessed Today Yes   I feel burned out by all of the attention and effort that diabetes demands of me. 1 - Not a Problem   It bothers me that diabetes seems to control my life. 1 - Not a Problem   I am frustrated that even when I do what I am supposed to for my diabetes, it doesn't seem to make a difference. 1 - Not a Problem   No matter how hard I try with my diabetes, it feels like it will never be good enough. 1 - Not a Problem   I am so tired of having to worry about diabetes all the time. 1 - Not a Problem   When it comes to my diabetes, I often feel like a failure. 1 - Not a Problem   It depresses me when I realize that my diabetes will likely never go away. 1 - Not a Problem   Living with diabetes is overwhelming for me. 1 - Not a Problem   T2 DDAS Total Score (0 - 1.9 Little or no DD, 2.0 - 2.9 Moderate DD,  3.0+ High DD) 1    Informal Support system: Family;Friends;Spouse       Patient-reported       Trish Padilla RD, CD, [facility]     Time Spent: 30 minutes  Encounter Type: Individual    Any diabetes medication dose changes were made via the [facility] Standing Orders under the patient's referring provider.

## 2025-06-12 NOTE — PATIENT INSTRUCTIONS
Goals:  Practice healthy stress management and mindful eating - think are you physically hungry or are you bored, stressed, emotional etc, make of list of things to do besides eat.    Try to get good quality sleep with a goal of 7-8 hours per night.  Stay physically active daily.  Recommend working up to a total of 30 minutes on 5 days/ week.  Recommend a fitness tracker.     Eat in a healthy way- eliminate trans fats, limit saturated fats and added sugars; follow the plate method - picture above.  Keep a food record (MyFitnessPal, Loseit).    A meal is 3 or more food groups; make it colorful for better nutrition.    Total Carbohydrates (in grams) = Breakfast  30    Lunch  30    Supper  30    If desired snacks 15                Blood Glucose testing max at any point 180 mg/dL  Occasional testing (before and 2 hours after one meal)  Before eating goal 80 - 130mg/dL  2 hours after goal 80 - 150mg/dL

## 2025-06-12 NOTE — LETTER
6/12/2025         RE: Iker Escobar  [address] [facility] [facility] [address]        Dear Colleague,    Thank you for referring your patient, Iker Escobar, to the [facility]. Please see a copy of my visit note below.    Diabetes Self-Management Education & Support    Presents for: Follow-up type 2 diabetes obesity class I Body mass index is 32.72 kg/m .    Type of Service: In Person Visit      Assessment  3/6/2025 patient's A1c gradually increasing.  Patient has been trying to follow a carb controlled and active lifestyle since diagnosis and has been able to maintain A1c typically under 7, since 2024 at 7.4% patient has decided to move forward with medication and would like to start Ozempic.  Increasing A1c likely related to natural progression of diabetes.      6/12/2025 Patient's A1c taken today showing improvement at 6.7%.  Patient is tolerating 1 mg of Ozempic well.  No changes in diabetes plan.  Patient will continue to follow dietary and lifestyle interventions with carbohydrate controlled meal plan and trying to stay active.     Patient's most recent   Lab Results   Component Value Date    A1C 6.7 06/12/2025     is meeting goal of <7.0    Diabetes knowledge and skills assessment:   Patient is knowledgeable in diabetes management concepts related to: Healthy Eating, Being Active, Monitoring, Taking Medication, Problem Solving, Reducing Risks, and Healthy Coping    Based on learning assessment above, most appropriate setting for further diabetes education would be: Individual setting.    Care Plan and Education Provided:  Healthy Eating: Eating out, Heart healthy diet, and Weight Management,     Being Active: Amount recommended (150 minutes moderate or 75 minutes vigorous activity and 2-3 days strength training per week) and Relationship of activity to glucose,     Monitoring: Frequency of monitoring and Individual glucose targets,     Taking Medication: Action of prescribed  medication(s), Administering and storing injectable diabetes medications, and Proper site selection and rotation for injections,     Problem Solving: When to call a health care provider,     Reducing Risks: Complications of diabetes, Preventing cardiovascular disease, including blood pressure goals, lipid goals, recommendations for cardioprotective medications, statins, and aspirin, and A1C - goals, relating to blood glucose levels, how often to check, and     Healthy Coping: Identifying helpful resources    Patient verbalized understanding of diabetes self-management education concepts discussed, opportunities for ongoing education and support, and recommendations provided today.    Plan  Continue Ozempic 1 mg weekly    Blood Glucose testing   Test on 3 days per week (before and 2 hours after one meal)  Before eating goal 80 - 130mg/dL  2 hours after goal 80 - 150mg/dL     Topics to cover at upcoming visits: Any area as needed for ongoing diabetes self-management education and support/ motivational counseling.    See Care Plan for co-developed, patient-state behavior change goals.    Education Materials Provided:  Goals for diabetes care      Subjective/Objective  Feroz is an 74 year old, presenting for the following diabetes education related to: Follow-up  Accompanied by: Self  Diabetes education in the past 24mo: Yes  Diabetes type: (Patient-Rptd) Type 2  Date of diagnosis: 4/2018  Disease course: Improving  How confident are you filling out medical forms by yourself:: (Patient-Rptd) Somewhat  Transportation concerns: No  Difficulty affording diabetes medication?: No  Difficulty affording diabetes testing supplies?: No  Other concerns: None  Cultural Influences/Ethnic Background:  Not  or     Diabetes Symptoms & Complications:  Diabetes Related Symptoms: (Patient-Rptd) Fatigue  Weight trend: (Patient-Rptd) Stable  Symptom course: (Patient-Rptd) Stable  Disease course: Improving  Complications  "assessed today?: Yes  Autonomic neuropathy: No  CVA: No  Heart disease: No  Nephropathy: No  Peripheral neuropathy: No  Peripheral Vascular Disease: No  Retinopathy: No    Patient Problem List and Family Medical History reviewed for relevant medical history, current medical status, and diabetes risk factors.    Vitals:  Ht 1.74 m (5' 8.5\")   Wt 99.1 kg (218 lb 6.4 oz)   BMI 32.72 kg/m    Estimated body mass index is 32.72 kg/m  as calculated from the following:    Height as of this encounter: 1.74 m (5' 8.5\").    Weight as of this encounter: 99.1 kg (218 lb 6.4 oz).   Last 3 BP:   BP Readings from Last 3 Encounters:   03/04/25 120/74   12/19/24 112/70   02/29/24 124/74       History   Smoking Status     Every Day     Types: Cigarettes   Smokeless Tobacco     Never       Labs:  Lab Results   Component Value Date    A1C 6.7 06/12/2025     Lab Results   Component Value Date     12/19/2024     03/31/2022     Lab Results   Component Value Date    LDL 49 03/04/2025     Direct Measure HDL   Date Value Ref Range Status   03/04/2025 37 (L) >=40 mg/dL Final     GFR Estimate   Date Value Ref Range Status   12/19/2024 71 >60 mL/min/1.73m2 Final     Comment:     eGFR calculated using 2021 CKD-EPI equation.     No results found for: \"GFRESTBLACK\"  Lab Results   Component Value Date    CR 1.11 12/19/2024     Lab Results   Component Value Date    MICROL <12.0 03/04/2025    UMALCR  03/04/2025      Comment:      Unable to calculate, urine albumin and/or urine creatinine is outside detectable limits.  Microalbuminuria is defined as an albumin:creatinine ratio of 17 to 299 for males and 25 to 299 for females. A ratio of albumin:creatinine of 300 or higher is indicative of overt proteinuria.  Due to biologic variability, positive results should be confirmed by a second, first-morning random or 24-hour timed urine specimen. If there is discrepancy, a third specimen is recommended. When 2 out of 3 results are in the " microalbuminuria range, this is evidence for incipient nephropathy and warrants increased efforts at glucose control, blood pressure control, and institution of therapy with an angiotensin-converting-enzyme (ACE) inhibitor (if the patient can tolerate it).      RR 45.0 03/04/2025 6/11/2025   Healthy Eating   Healthy Eating Assessed Today Yes   Cultural/Sabianist diet restrictions? No   Do you have any food allergies or intolerances? No   Meal planning/habits Heart healthy       carb controlled   Who cooks/prepares meals for you? Self;Spouse   Who purchases food in  your home? Self;Spouse   How many times a week on average do you eat food made away from home (restaurant/take-out)? 2   Meals include Breakfast;Lunch;Dinner;Evening Snack   Breakfast cereal life with 2% milk - no change many years - since 2018   Lunch whatever wife fixes stirfry with chicken   Dinner hamburger elana and salad no but, peas, corn, carrots   Snacks nuts   Beverages Water;Coffee;Milk;Juice   Has patient met with a dietitian in the past? Yes       2018 6/11/2025   Being Active   Being Active Assessed Today Yes   Exercise: Currently not exercising       not much in the winter   Barrier to exercise None         6/11/2025   Monitoring   Monitoring Assessed Today Yes   Did patient bring glucose meter to appointment?  No   Blood Glucose Meter Accu-chek   Times checking blood sugar at home (number) 1   Times checking blood sugar at home (per) Week   Blood glucose trend No change       gradual a1c increasing   Patient reports last BG check 87 mg/dL  Diabetes Medication(s)       Incretin Mimetic Agents       Semaglutide, 1 MG/DOSE, (OZEMPIC) 4 MG/3ML pen Inject 1 mg subcutaneously every 7 days.              6/11/2025   Taking Medications   Taking Medication Assessed Today Yes   Current Treatments Non-insulin Injectables   Problems taking diabetes medications regularly? No   Diabetes medication side effects? --        taste  different, little decreased appetite         6/11/2025   Problem Solving   Problem Solving Assessed Today Yes   Is the patient at risk for hypoglycemia? No   Is the patient at risk for DKA? No   Does patient have severe weather/disaster plan for diabetes management? Yes   Does patient have sick day plan for diabetes management? Yes           6/11/2025   Reducing Risks   Reducing Risks Assessed Today No   Diabetes Risks Age over 45 years   CAD Risks Family history;Diabetes Mellitus;Dyslipidemia;Obesity;Male sex   Has dilated eye exam at least once a year? Yes   Sees dentist every 6 months? No   Feet checked by healthcare provider in the last year? Yes         6/11/2025   Healthy Coping: Diabetes Distress Assessment   Healthy Coping Assessed Today Yes   I feel burned out by all of the attention and effort that diabetes demands of me. 1 - Not a Problem   It bothers me that diabetes seems to control my life. 1 - Not a Problem   I am frustrated that even when I do what I am supposed to for my diabetes, it doesn't seem to make a difference. 1 - Not a Problem   No matter how hard I try with my diabetes, it feels like it will never be good enough. 1 - Not a Problem   I am so tired of having to worry about diabetes all the time. 1 - Not a Problem   When it comes to my diabetes, I often feel like a failure. 1 - Not a Problem   It depresses me when I realize that my diabetes will likely never go away. 1 - Not a Problem   Living with diabetes is overwhelming for me. 1 - Not a Problem   T2 DDAS Total Score (0 - 1.9 Little or no DD, 2.0 - 2.9 Moderate DD,  3.0+ High DD) 1    Informal Support system: Family;Friends;Spouse       Patient-reported       Trish Padilla RD, CD, [facility]     Time Spent: 30 minutes  Encounter Type: Individual    Any diabetes medication dose changes were made via the [facility] Standing Orders under the patient's referring provider.

## 2025-07-21 ENCOUNTER — MYC REFILL (OUTPATIENT)
Dept: FAMILY MEDICINE | Facility: CLINIC | Age: 75
End: 2025-07-21
Payer: COMMERCIAL

## 2025-07-21 DIAGNOSIS — E11.9 TYPE 2 DIABETES MELLITUS WITH HEMOGLOBIN A1C GOAL OF LESS THAN 7.0% (H): ICD-10-CM

## 2025-07-22 NOTE — TELEPHONE ENCOUNTER
Prescription approved per [facility] refill protocol.  Bibiana STANLEY RN

## 2025-08-04 ENCOUNTER — TELEPHONE (OUTPATIENT)
Dept: FAMILY MEDICINE | Facility: CLINIC | Age: 75
End: 2025-08-04
Payer: COMMERCIAL

## 2025-08-14 ENCOUNTER — MEDICAL CORRESPONDENCE (OUTPATIENT)
Dept: HEALTH INFORMATION MANAGEMENT | Facility: CLINIC | Age: 75
End: 2025-08-14
Payer: COMMERCIAL